# Patient Record
Sex: MALE | Race: OTHER | HISPANIC OR LATINO | Employment: UNEMPLOYED | URBAN - METROPOLITAN AREA
[De-identification: names, ages, dates, MRNs, and addresses within clinical notes are randomized per-mention and may not be internally consistent; named-entity substitution may affect disease eponyms.]

---

## 2023-01-31 ENCOUNTER — APPOINTMENT (EMERGENCY)
Dept: CT IMAGING | Facility: HOSPITAL | Age: 28
End: 2023-01-31

## 2023-01-31 ENCOUNTER — HOSPITAL ENCOUNTER (OUTPATIENT)
Facility: HOSPITAL | Age: 28
Setting detail: OBSERVATION
Discharge: HOME | End: 2023-01-31
Attending: STUDENT IN AN ORGANIZED HEALTH CARE EDUCATION/TRAINING PROGRAM | Admitting: SURGERY

## 2023-01-31 ENCOUNTER — ANESTHESIA (OUTPATIENT)
Dept: PERIOP | Facility: HOSPITAL | Age: 28
End: 2023-01-31

## 2023-01-31 ENCOUNTER — ANESTHESIA EVENT (OUTPATIENT)
Dept: PERIOP | Facility: HOSPITAL | Age: 28
End: 2023-01-31

## 2023-01-31 VITALS
HEIGHT: 68 IN | DIASTOLIC BLOOD PRESSURE: 80 MMHG | WEIGHT: 193.12 LBS | RESPIRATION RATE: 18 BRPM | HEART RATE: 92 BPM | SYSTOLIC BLOOD PRESSURE: 107 MMHG | BODY MASS INDEX: 29.27 KG/M2 | TEMPERATURE: 97.4 F | OXYGEN SATURATION: 97 %

## 2023-01-31 DIAGNOSIS — K35.80 ACUTE APPENDICITIS: ICD-10-CM

## 2023-01-31 DIAGNOSIS — K35.80 ACUTE APPENDICITIS, UNSPECIFIED ACUTE APPENDICITIS TYPE: Primary | ICD-10-CM

## 2023-01-31 PROBLEM — F17.200 SMOKING: Status: ACTIVE | Noted: 2023-01-31

## 2023-01-31 LAB
ALBUMIN SERPL BCP-MCNC: 4 G/DL (ref 3.5–5)
ALP SERPL-CCNC: 52 U/L (ref 43–122)
ALT SERPL W P-5'-P-CCNC: 13 U/L
ANION GAP SERPL CALCULATED.3IONS-SCNC: 7 MMOL/L (ref 5–14)
AST SERPL W P-5'-P-CCNC: 18 U/L (ref 17–59)
BACTERIA UR QL AUTO: ABNORMAL /HPF
BASOPHILS # BLD AUTO: 0.01 THOUSANDS/ÂΜL (ref 0–0.1)
BASOPHILS NFR BLD AUTO: 0 % (ref 0–1)
BILIRUB SERPL-MCNC: 0.39 MG/DL (ref 0.2–1)
BILIRUB UR QL STRIP: NEGATIVE
BUN SERPL-MCNC: 10 MG/DL (ref 5–25)
CALCIUM SERPL-MCNC: 9.2 MG/DL (ref 8.4–10.2)
CAOX CRY URNS QL MICRO: ABNORMAL /HPF
CHLORIDE SERPL-SCNC: 99 MMOL/L (ref 96–108)
CLARITY UR: CLEAR
CO2 SERPL-SCNC: 30 MMOL/L (ref 21–32)
COLOR UR: ABNORMAL
CREAT SERPL-MCNC: 0.84 MG/DL (ref 0.7–1.5)
EOSINOPHIL # BLD AUTO: 0.02 THOUSAND/ÂΜL (ref 0–0.61)
EOSINOPHIL NFR BLD AUTO: 0 % (ref 0–6)
ERYTHROCYTE [DISTWIDTH] IN BLOOD BY AUTOMATED COUNT: 11.6 % (ref 11.6–15.1)
GFR SERPL CREATININE-BSD FRML MDRD: 119 ML/MIN/1.73SQ M
GLUCOSE SERPL-MCNC: 140 MG/DL (ref 70–99)
GLUCOSE UR STRIP-MCNC: NEGATIVE MG/DL
HCT VFR BLD AUTO: 40.6 % (ref 36.5–49.3)
HGB BLD-MCNC: 13.8 G/DL (ref 12–17)
HGB UR QL STRIP.AUTO: 25
IMM GRANULOCYTES # BLD AUTO: 0.03 THOUSAND/UL (ref 0–0.2)
IMM GRANULOCYTES NFR BLD AUTO: 0 % (ref 0–2)
KETONES UR STRIP-MCNC: ABNORMAL MG/DL
LEUKOCYTE ESTERASE UR QL STRIP: NEGATIVE
LIPASE SERPL-CCNC: 30 U/L (ref 23–300)
LYMPHOCYTES # BLD AUTO: 0.83 THOUSANDS/ÂΜL (ref 0.6–4.47)
LYMPHOCYTES NFR BLD AUTO: 8 % (ref 14–44)
MCH RBC QN AUTO: 29.6 PG (ref 26.8–34.3)
MCHC RBC AUTO-ENTMCNC: 34 G/DL (ref 31.4–37.4)
MCV RBC AUTO: 87 FL (ref 82–98)
MONOCYTES # BLD AUTO: 0.47 THOUSAND/ÂΜL (ref 0.17–1.22)
MONOCYTES NFR BLD AUTO: 4 % (ref 4–12)
MUCOUS THREADS UR QL AUTO: ABNORMAL
NEUTROPHILS # BLD AUTO: 9.5 THOUSANDS/ÂΜL (ref 1.85–7.62)
NEUTS SEG NFR BLD AUTO: 88 % (ref 43–75)
NITRITE UR QL STRIP: NEGATIVE
NON-SQ EPI CELLS URNS QL MICRO: ABNORMAL /HPF
NRBC BLD AUTO-RTO: 0 /100 WBCS
PH UR STRIP.AUTO: 6 [PH]
PLATELET # BLD AUTO: 146 THOUSANDS/UL (ref 149–390)
PMV BLD AUTO: 10.7 FL (ref 8.9–12.7)
POTASSIUM SERPL-SCNC: 3.6 MMOL/L (ref 3.5–5.3)
PROT SERPL-MCNC: 7.1 G/DL (ref 6.4–8.4)
PROT UR STRIP-MCNC: ABNORMAL MG/DL
RBC # BLD AUTO: 4.67 MILLION/UL (ref 3.88–5.62)
RBC #/AREA URNS AUTO: ABNORMAL /HPF
SODIUM SERPL-SCNC: 136 MMOL/L (ref 135–147)
SP GR UR STRIP.AUTO: 1.02 (ref 1–1.04)
UROBILINOGEN UA: 1 MG/DL
WBC # BLD AUTO: 10.86 THOUSAND/UL (ref 4.31–10.16)
WBC #/AREA URNS AUTO: ABNORMAL /HPF

## 2023-01-31 RX ORDER — CEFAZOLIN SODIUM 2 G/50ML
2000 SOLUTION INTRAVENOUS EVERY 8 HOURS
Status: DISCONTINUED | OUTPATIENT
Start: 2023-01-31 | End: 2023-01-31

## 2023-01-31 RX ORDER — FENTANYL CITRATE/PF 50 MCG/ML
25 SYRINGE (ML) INJECTION
Status: DISCONTINUED | OUTPATIENT
Start: 2023-01-31 | End: 2023-01-31 | Stop reason: HOSPADM

## 2023-01-31 RX ORDER — OXYCODONE HYDROCHLORIDE AND ACETAMINOPHEN 5; 325 MG/1; MG/1
2 TABLET ORAL EVERY 4 HOURS PRN
Status: DISCONTINUED | OUTPATIENT
Start: 2023-01-31 | End: 2023-01-31 | Stop reason: HOSPADM

## 2023-01-31 RX ORDER — KETOROLAC TROMETHAMINE 30 MG/ML
15 INJECTION, SOLUTION INTRAMUSCULAR; INTRAVENOUS ONCE
Status: COMPLETED | OUTPATIENT
Start: 2023-01-31 | End: 2023-01-31

## 2023-01-31 RX ORDER — SODIUM CHLORIDE 9 MG/ML
100 INJECTION, SOLUTION INTRAVENOUS CONTINUOUS
Status: DISCONTINUED | OUTPATIENT
Start: 2023-01-31 | End: 2023-01-31 | Stop reason: HOSPADM

## 2023-01-31 RX ORDER — OXYCODONE HYDROCHLORIDE AND ACETAMINOPHEN 5; 325 MG/1; MG/1
1 TABLET ORAL EVERY 6 HOURS PRN
Qty: 20 TABLET | Refills: 0 | Status: CANCELLED | OUTPATIENT
Start: 2023-01-31 | End: 2023-02-10

## 2023-01-31 RX ORDER — HYDROMORPHONE HCL/PF 1 MG/ML
0.5 SYRINGE (ML) INJECTION
Status: DISCONTINUED | OUTPATIENT
Start: 2023-01-31 | End: 2023-01-31

## 2023-01-31 RX ORDER — MIDAZOLAM HYDROCHLORIDE 2 MG/2ML
INJECTION, SOLUTION INTRAMUSCULAR; INTRAVENOUS AS NEEDED
Status: DISCONTINUED | OUTPATIENT
Start: 2023-01-31 | End: 2023-01-31

## 2023-01-31 RX ORDER — GLYCOPYRROLATE 0.2 MG/ML
INJECTION INTRAMUSCULAR; INTRAVENOUS AS NEEDED
Status: DISCONTINUED | OUTPATIENT
Start: 2023-01-31 | End: 2023-01-31

## 2023-01-31 RX ORDER — ONDANSETRON 2 MG/ML
INJECTION INTRAMUSCULAR; INTRAVENOUS AS NEEDED
Status: DISCONTINUED | OUTPATIENT
Start: 2023-01-31 | End: 2023-01-31

## 2023-01-31 RX ORDER — CEFTRIAXONE 1 G/50ML
1000 INJECTION, SOLUTION INTRAVENOUS ONCE
Status: DISCONTINUED | OUTPATIENT
Start: 2023-01-31 | End: 2023-01-31

## 2023-01-31 RX ORDER — MORPHINE SULFATE 4 MG/ML
4 INJECTION, SOLUTION INTRAMUSCULAR; INTRAVENOUS ONCE
Status: COMPLETED | OUTPATIENT
Start: 2023-01-31 | End: 2023-01-31

## 2023-01-31 RX ORDER — ACETAMINOPHEN 325 MG/1
650 TABLET ORAL EVERY 6 HOURS PRN
Status: DISCONTINUED | OUTPATIENT
Start: 2023-01-31 | End: 2023-01-31 | Stop reason: HOSPADM

## 2023-01-31 RX ORDER — NEOSTIGMINE METHYLSULFATE 1 MG/ML
INJECTION INTRAVENOUS AS NEEDED
Status: DISCONTINUED | OUTPATIENT
Start: 2023-01-31 | End: 2023-01-31

## 2023-01-31 RX ORDER — CEFAZOLIN SODIUM 1 G/50ML
SOLUTION INTRAVENOUS AS NEEDED
Status: DISCONTINUED | OUTPATIENT
Start: 2023-01-31 | End: 2023-01-31

## 2023-01-31 RX ORDER — LIDOCAINE HYDROCHLORIDE 10 MG/ML
INJECTION, SOLUTION EPIDURAL; INFILTRATION; INTRACAUDAL; PERINEURAL AS NEEDED
Status: DISCONTINUED | OUTPATIENT
Start: 2023-01-31 | End: 2023-01-31

## 2023-01-31 RX ORDER — OXYCODONE HYDROCHLORIDE AND ACETAMINOPHEN 5; 325 MG/1; MG/1
1 TABLET ORAL EVERY 6 HOURS PRN
Qty: 20 TABLET | Refills: 0 | Status: SHIPPED | OUTPATIENT
Start: 2023-01-31 | End: 2023-02-10

## 2023-01-31 RX ORDER — FENTANYL CITRATE 50 UG/ML
INJECTION, SOLUTION INTRAMUSCULAR; INTRAVENOUS AS NEEDED
Status: DISCONTINUED | OUTPATIENT
Start: 2023-01-31 | End: 2023-01-31

## 2023-01-31 RX ORDER — SODIUM CHLORIDE 9 MG/ML
INJECTION, SOLUTION INTRAVENOUS AS NEEDED
Status: DISCONTINUED | OUTPATIENT
Start: 2023-01-31 | End: 2023-01-31 | Stop reason: HOSPADM

## 2023-01-31 RX ORDER — ONDANSETRON 2 MG/ML
4 INJECTION INTRAMUSCULAR; INTRAVENOUS EVERY 4 HOURS PRN
Status: DISCONTINUED | OUTPATIENT
Start: 2023-01-31 | End: 2023-01-31 | Stop reason: HOSPADM

## 2023-01-31 RX ORDER — HYDROMORPHONE HCL/PF 1 MG/ML
0.2 SYRINGE (ML) INJECTION
Status: DISCONTINUED | OUTPATIENT
Start: 2023-01-31 | End: 2023-01-31

## 2023-01-31 RX ORDER — DEXAMETHASONE SODIUM PHOSPHATE 10 MG/ML
INJECTION, SOLUTION INTRAMUSCULAR; INTRAVENOUS AS NEEDED
Status: DISCONTINUED | OUTPATIENT
Start: 2023-01-31 | End: 2023-01-31

## 2023-01-31 RX ORDER — ONDANSETRON 2 MG/ML
4 INJECTION INTRAMUSCULAR; INTRAVENOUS ONCE AS NEEDED
Status: DISCONTINUED | OUTPATIENT
Start: 2023-01-31 | End: 2023-01-31 | Stop reason: HOSPADM

## 2023-01-31 RX ORDER — OXYCODONE HYDROCHLORIDE AND ACETAMINOPHEN 5; 325 MG/1; MG/1
1 TABLET ORAL EVERY 4 HOURS PRN
Status: DISCONTINUED | OUTPATIENT
Start: 2023-01-31 | End: 2023-01-31 | Stop reason: HOSPADM

## 2023-01-31 RX ORDER — PROPOFOL 10 MG/ML
INJECTION, EMULSION INTRAVENOUS AS NEEDED
Status: DISCONTINUED | OUTPATIENT
Start: 2023-01-31 | End: 2023-01-31

## 2023-01-31 RX ORDER — ROCURONIUM BROMIDE 10 MG/ML
INJECTION, SOLUTION INTRAVENOUS AS NEEDED
Status: DISCONTINUED | OUTPATIENT
Start: 2023-01-31 | End: 2023-01-31

## 2023-01-31 RX ORDER — HYDROMORPHONE HCL/PF 1 MG/ML
SYRINGE (ML) INJECTION AS NEEDED
Status: DISCONTINUED | OUTPATIENT
Start: 2023-01-31 | End: 2023-01-31

## 2023-01-31 RX ORDER — KETOROLAC TROMETHAMINE 30 MG/ML
INJECTION, SOLUTION INTRAMUSCULAR; INTRAVENOUS AS NEEDED
Status: DISCONTINUED | OUTPATIENT
Start: 2023-01-31 | End: 2023-01-31

## 2023-01-31 RX ORDER — BUPIVACAINE HYDROCHLORIDE 5 MG/ML
INJECTION, SOLUTION PERINEURAL AS NEEDED
Status: DISCONTINUED | OUTPATIENT
Start: 2023-01-31 | End: 2023-01-31 | Stop reason: HOSPADM

## 2023-01-31 RX ADMIN — CEFAZOLIN SODIUM 2000 MG: 2 SOLUTION INTRAVENOUS at 12:56

## 2023-01-31 RX ADMIN — DEXAMETHASONE SODIUM PHOSPHATE 10 MG: 10 INJECTION INTRAMUSCULAR; INTRAVENOUS at 13:24

## 2023-01-31 RX ADMIN — KETOROLAC TROMETHAMINE 15 MG: 30 INJECTION, SOLUTION INTRAMUSCULAR; INTRAVENOUS at 00:51

## 2023-01-31 RX ADMIN — IOHEXOL 100 ML: 350 INJECTION, SOLUTION INTRAVENOUS at 01:35

## 2023-01-31 RX ADMIN — PIPERACILLIN AND TAZOBACTAM 3.38 G: 3; .375 INJECTION, POWDER, LYOPHILIZED, FOR SOLUTION INTRAVENOUS; PARENTERAL at 10:57

## 2023-01-31 RX ADMIN — FENTANYL CITRATE 50 MCG: 50 INJECTION, SOLUTION INTRAMUSCULAR; INTRAVENOUS at 13:28

## 2023-01-31 RX ADMIN — ACETAMINOPHEN 650 MG: 325 TABLET ORAL at 10:46

## 2023-01-31 RX ADMIN — MORPHINE SULFATE 4 MG: 4 INJECTION, SOLUTION INTRAMUSCULAR; INTRAVENOUS at 07:53

## 2023-01-31 RX ADMIN — FENTANYL CITRATE 50 MCG: 50 INJECTION, SOLUTION INTRAMUSCULAR; INTRAVENOUS at 13:00

## 2023-01-31 RX ADMIN — ONDANSETRON 4 MG: 2 INJECTION INTRAMUSCULAR; INTRAVENOUS at 14:00

## 2023-01-31 RX ADMIN — SODIUM CHLORIDE 1000 ML: 0.9 INJECTION, SOLUTION INTRAVENOUS at 00:51

## 2023-01-31 RX ADMIN — PROPOFOL 200 MG: 10 INJECTION, EMULSION INTRAVENOUS at 13:00

## 2023-01-31 RX ADMIN — LIDOCAINE HYDROCHLORIDE 50 MG: 10 INJECTION, SOLUTION EPIDURAL; INFILTRATION; INTRACAUDAL; PERINEURAL at 13:00

## 2023-01-31 RX ADMIN — KETOROLAC TROMETHAMINE 30 MG: 30 INJECTION, SOLUTION INTRAMUSCULAR; INTRAVENOUS at 14:00

## 2023-01-31 RX ADMIN — GLYCOPYRROLATE 0.4 MG: 0.2 INJECTION, SOLUTION INTRAMUSCULAR; INTRAVENOUS at 14:09

## 2023-01-31 RX ADMIN — SODIUM CHLORIDE 100 ML/HR: 0.9 INJECTION, SOLUTION INTRAVENOUS at 10:46

## 2023-01-31 RX ADMIN — MIDAZOLAM 2 MG: 1 INJECTION INTRAMUSCULAR; INTRAVENOUS at 12:56

## 2023-01-31 RX ADMIN — ROCURONIUM BROMIDE 30 MG: 10 INJECTION, SOLUTION INTRAVENOUS at 13:00

## 2023-01-31 RX ADMIN — NEOSTIGMINE 3 MG: 1 INJECTION INTRAVENOUS at 14:09

## 2023-01-31 RX ADMIN — ROCURONIUM BROMIDE 20 MG: 10 INJECTION, SOLUTION INTRAVENOUS at 13:28

## 2023-01-31 RX ADMIN — HYDROMORPHONE HYDROCHLORIDE 1 MG: 1 INJECTION, SOLUTION INTRAMUSCULAR; INTRAVENOUS; SUBCUTANEOUS at 14:11

## 2023-01-31 NOTE — QUICK NOTE
Hold abx until after repeat CT completed, Dr Nika Ma taking over call this morning and notified of patient

## 2023-01-31 NOTE — ED NOTES
Patient has completed his omniopaque  Is aware of the instruction from surgical service of waiting for 3 hours from the start of the drinking which was ar 0430 until cat scan should be completed  Lights dimmed  Is resting quietly and offers no complaints of pain at this time       Zabrina Smith RN  01/31/23 2059

## 2023-01-31 NOTE — DISCHARGE INSTR - AVS FIRST PAGE
[address] B  [facility] Surgery Discharge Instructions      1  General: You will feel pulling sensations around the wound or funny aches and pains around the incisions  You may have some bruising or mild redness  This is normal  Even minor surgery is a change in your body and this is your body’s reaction to it  If you have had abdominal surgery, it may help to support the incision with a small pillow or blanket for comfort when moving or coughing  There may be some hardness surrounding your incision which is your body's normal reaction to surgery  This typically softens up over time  A heating pad or ice pack can also be beneficial in the post-op period  2  Wound care: Make sure to remove the overlying dressing/bandage in about 24 hours, unless instructed otherwise  You usually don't have to redress the wound after 24-48 hours, unless for comfort  Keep the incision clean and dry  Let air get to it  If this Steri-Strips fall off, just keep the wound clean  If there is surgical glue in place this will usually start to soften in around 2 weeks and will eventually dissolve or fall off with gentle scrubbing in the shower  3  Water: You may shower over the wound, unless there are drain tubes left in place  Do not bathe or use a pool or hot tub until cleared by the physician  Do not swim in a lake or ocean until cleared by your physician  You may shower right over the staples or Steri-Strips and packing dry when you are done  4  Activity: You may go up and down stairs, walk as much as you are comfortable, but walk at least 3 times each day  If you have had abdominal surgery, do not lift anything heavier than 15 pounds for at least 2-4 weeks, unless cleared by the doctor  Notes and paperwork for your job are typically filled out at your post-op appointment but if there is a time constraint please call the office for assistance if this is needed prior to your post-op check  5  Diet: You may resume a regular diet  If you had a same-day surgery or overnight stay surgery, you may wish to eat lightly for a few days: soups, crackers, and sandwiches  You may resume a regular diet when ready  6  Medications: Resume all of your previous medications, unless told otherwise by the doctor  Ibuprofen (Advil, Motrin, etc ) is usually ok unless specifically discouraged by your surgeon  400-600 mg of ibuprofen every 6 hours for post-surgical pain is an acceptable regimen with a maximum dosage of 2400 mg per day  Avoid ibuprofen if you are taking aspirin  If you have a bleeding disorder or have stomach issues, talk to your surgeon prior to use  Tylenol is ok, unless you are taking any narcotic pain medication containing Tylenol (such as Percocet, Darvocet, Vicodin, or anything containing acetaminophen)  Be cautious taking additional Tylenol if you're taking these medications as there is a maximum dosage of 4,000 mg of Tylenol per day  You do not need to take the narcotic pain medications unless you are having significant pain and discomfort  7  Driving: You will need someone to drive you home on the day of surgery  Do not drive or make any important decisions while on narcotic pain medication or 24 hours and after anesthesia or sedation for surgery  Generally, you may drive when you are off all narcotic pain medications  8  Upset Stomach: You may take Maalox, Tums, or similar items for an upset stomach  If your narcotic pain medication causes an upset stomach, do not take it on an empty stomach  Try taking it with at least some crackers or toast      9  Constipation: Patients often experience constipation after surgery due to anesthesia and pain medication  This is especially common after abdominal surgery  You may take over-the-counter medication for this, such as Metamucil, Senokot, Dulcolax, milk of magnesia, etc  You may take a suppository unless you have had anorectal surgery such as a procedure on your hemorrhoids   If you experience significant nausea or vomiting after abdominal surgery, call the office before trying any of these medications  10  Pain: You may be given a prescription for pain  This will be prescribed the day of surgery and sent to your pharmacy of choice  Take the pain medication as prescribed, only if you need it  Narcotic pain medications (such as anything containing hydrocodone or oxycodone) have many side effects such as nausea/vomiting, constipation, dizziness and respiratory depression  Do not drive when taking the pain medication  Do not take more than prescribed in the 4-6 hour time period  11  Sexual Activity: You may resume sexual activity when you feel ready and comfortable and your incision is sealed and healed without apparent infection risk  12  Urination: If you haven't urinated in 6 hours and are unable to urinate please call the office  If you are having pain and can not urinate you need to be evaluated by a physician and should go to the emergency room  Call the office: If you are experiencing any of the following, fevers above 101 5°, significant nausea or vomiting, if the wound develops drainage and/or has excessive redness around the wound, or if you have significant diarrhea or other worsening symptoms

## 2023-01-31 NOTE — ED NOTES
Appeared to be sleeping upon entering room  Reports that his pain is less       Humberto Shannon RN  01/31/23 7749

## 2023-01-31 NOTE — ANESTHESIA PREPROCEDURE EVALUATION
Procedure:  APPENDECTOMY LAPAROSCOPIC (Abdomen)    Relevant Problems   CARDIO (within normal limits)      ENDO (within normal limits)      /RENAL (within normal limits)      MUSCULOSKELETAL (within normal limits)      PULMONARY   (+) Smoking      Digestive   (+) Acute appendicitis        Physical Exam    Airway    Mallampati score: II  TM Distance: >3 FB  Neck ROM: full     Dental       Cardiovascular  Cardiovascular exam normal    Pulmonary  Pulmonary exam normal     Other Findings        Anesthesia Plan  ASA Score- 2     Anesthesia Type- general with ASA Monitors  Additional Monitors:   Airway Plan: ETT  Plan Factors-Exercise tolerance (METS): >4 METS  Chart reviewed  Existing labs reviewed  Patient summary reviewed  Patient is a current smoker  Patient instructed to abstain from smoking on day of procedure  Patient did not smoke on day of surgery  Induction- intravenous  Postoperative Plan- Plan for postoperative opioid use  Informed Consent- Anesthetic plan and risks discussed with patient  I personally reviewed this patient with the CRNA  Discussed and agreed on the Anesthesia Plan with the CRNA  Lisa Pichardo             No results found for: HGBA1C    Lab Results   Component Value Date    K 3 6 01/31/2023    CL 99 01/31/2023    CO2 30 01/31/2023    BUN 10 01/31/2023    CREATININE 0 84 01/31/2023    CALCIUM 9 2 01/31/2023    AST 18 01/31/2023    ALT 13 01/31/2023    ALKPHOS 52 01/31/2023    EGFR 119 01/31/2023       Lab Results   Component Value Date    WBC 10 86 (H) 01/31/2023    HGB 13 8 01/31/2023    HCT 40 6 01/31/2023    MCV 87 01/31/2023     (L) 01/31/2023

## 2023-01-31 NOTE — H&P
H&P Exam - General Surgery   Tawana Kinsey 33 y o  male MRN: [id number]  Unit/Bed#: OR [place] Encounter: [id number]    Assessment/Plan     Assessment:  Acute appendicitis  Plan:  Discussed laparoscopic appendectomy, risks of procedure were explained and informed consent obtained  NPO for procedure  IV abx  Pain control  Discussed expected post op course and recovery, possible d/c later today if meets discharge criteria    History of Present Illness     HPI:  Tawana Kinsey is a 33 y o  male who presents with one day of abdominal pain, vomiting and diarrhea  Pain localizes to RLQ  He has had no prior abdominal surgeries  In the ED he had a CT scan showing mildly dilated appendix with equivocal periappendiceal inflammation  Repeat CT with PO contrast was done which showed increased appendiceal dilation, no opacification of appendix with cecal contrast  He has a leukocytosis of 10 8  Review of Systems   Gastrointestinal: Positive for abdominal pain, diarrhea and vomiting  All other systems reviewed and are negative        Historical Information   Past Medical History:   Diagnosis Date   • Known health problems: none      Past Surgical History:   Procedure Laterality Date   • NO PAST SURGERIES       Social History   Social History     Substance and Sexual Activity   Alcohol Use Yes    Comment: social     Social History     Substance and Sexual Activity   Drug Use Not Currently     Social History     Tobacco Use   Smoking Status Never   Smokeless Tobacco Not on file     E-Cigarette/Vaping   • E-Cigarette Use Current Some Day User      E-Cigarette/Vaping Substances     Family History: non-contributory    Meds/Allergies   all medications and allergies reviewed  No Known Allergies    Objective   First Vitals:   Blood Pressure: 116/67 (01/31/23 0022)  Pulse: 78 (01/31/23 0022)  Temperature: 100 °F (37 8 °C) (01/31/23 0022)  Temp Source: Tympanic (01/31/23 0022)  Respirations: 18 (01/31/23 0022)  Height: 5' 8" (172 7 cm) (01/31/23 1036)  Weight - Scale: 88 kg (194 lb) (01/31/23 0022)  SpO2: 98 % (01/31/23 0022)    Current Vitals:   Blood Pressure: 112/73 (01/31/23 1036)  Pulse: 76 (01/31/23 1036)  Temperature: 98 °F (36 7 °C) (01/31/23 1036)  Temp Source: Temporal (01/31/23 1036)  Respirations: 16 (01/31/23 1036)  Height: 5' 8" (172 7 cm) (01/31/23 1036)  Weight - Scale: 87 6 kg (193 lb 2 oz) (01/31/23 1036)  SpO2: 97 % (01/31/23 1036)      Intake/Output Summary (Last 24 hours) at 1/31/2023 1244  Last data filed at 1/31/2023 0227  Gross per 24 hour   Intake 1000 ml   Output --   Net 1000 ml       Invasive Devices     Peripheral Intravenous Line  Duration           Peripheral IV 01/31/23 Right Antecubital <1 day                Physical Exam  Vitals reviewed  Constitutional:       General: He is not in acute distress  Appearance: Normal appearance  HENT:      Head: Normocephalic and atraumatic  Nose: Nose normal       Mouth/Throat:      Mouth: Mucous membranes are moist       Pharynx: Oropharynx is clear  Eyes:      Conjunctiva/sclera: Conjunctivae normal       Pupils: Pupils are equal, round, and reactive to light  Cardiovascular:      Rate and Rhythm: Normal rate and regular rhythm  Heart sounds: Normal heart sounds  Pulmonary:      Effort: Pulmonary effort is normal  No respiratory distress  Breath sounds: Normal breath sounds  Abdominal:      General: Abdomen is flat  There is no distension  Palpations: Abdomen is soft  Tenderness: There is abdominal tenderness in the right lower quadrant  There is no guarding or rebound  Musculoskeletal:         General: No swelling or tenderness  Normal range of motion  Cervical back: Normal range of motion and neck supple  Skin:     General: Skin is warm and dry  Neurological:      General: No focal deficit present  Mental Status: He is alert and oriented to person, place, and time           Lab Results:   I have personally reviewed pertinent lab results  , CBC:   Lab Results   Component Value Date    WBC 10 86 (H) 01/31/2023    HGB 13 8 01/31/2023    HCT 40 6 01/31/2023    MCV 87 01/31/2023     (L) 01/31/2023    MCH 29 6 01/31/2023    MCHC 34 0 01/31/2023    RDW 11 6 01/31/2023    MPV 10 7 01/31/2023    NRBC 0 01/31/2023   , CMP:   Lab Results   Component Value Date    SODIUM 136 01/31/2023    K 3 6 01/31/2023    CL 99 01/31/2023    CO2 30 01/31/2023    BUN 10 01/31/2023    CREATININE 0 84 01/31/2023    CALCIUM 9 2 01/31/2023    AST 18 01/31/2023    ALT 13 01/31/2023    ALKPHOS 52 01/31/2023    EGFR 119 01/31/2023     Imaging: I have personally reviewed pertinent reports  and I have personally reviewed pertinent films in PACS  EKG, Pathology, and Other Studies: I have personally reviewed pertinent reports

## 2023-01-31 NOTE — OP NOTE
OPERATIVE REPORT  PATIENT NAME: Renetta Lees    :  1995  MRN: [id number]  Pt Location:  OR ROOM 12    SURGERY DATE: 2023    Surgeon(s) and Role:     * Maeve Flanagan MD - Primary    Preop Diagnosis:  Acute appendicitis, unspecified acute appendicitis type [K35 80]    Post-Op Diagnosis Codes:     * Acute appendicitis, unspecified acute appendicitis type [K35 80]    Procedure(s):  APPENDECTOMY LAPAROSCOPIC    Specimen(s):  ID Type Source Tests Collected by Time Destination   1 : APPENDIX Tissue Appendix TISSUE EXAM Maeve Flanagan MD 2023 1351        Estimated Blood Loss:   Minimal    Drains:  * No LDAs found *    Anesthesia Type:   General    Operative Indications:  Acute appendicitis, unspecified acute appendicitis type [K35 80]      Operative Findings:  Acute appendicitis, subcentimeter umbilical hernia    Complications:   None    Procedure and Technique:  Patient was identified in the preoperative holding area and taken back to the operating room  The patient was positioned supine on the operating room table  General anesthesia was then induced and the patient was prepped and draped in the standard sterile surgical fashion  Preoperative time-out was held confirming the correct patient, correct procedure and that all necessary equipment and personnel were present within the operating room  Preoperative antibiotics were administered prior to incision  Incision was made just below the umbilicus with a scalpel and a Veress needle was inserted into the abdomen  The abdomen was insufflated to 15 mmHg  Veress needle was then removed and a 5 mm port placed through this incision  Laparoscope was inserted into the port  Intra-abdominal contents were inspected and there was no trauma from port or needle placement  A 5 mm port was then placed in the suprapubic region and a 12 mm port in the left lower quadrant under direct visualization in similar fashion     Atraumatic graspers were used to bluntly manipulate the bowel in the right lower quadrant, identifying the cecum and the terminal ileum  The ileal rudder was grasped and the appendix was identified  There was fibrinous exudate at the tip of the appendix  The appendix was grasped with an atraumatic grasper and manipulated such that the mesoappendix was accessible  The Harmonic scalpel was used to dissect through the mesoappendix down to the appendix base  The appendix base was free of any inflammation  An Endo-ELISE stapler with a white load was then used to transect the appendix at the base  The appendix then placed into an Endo-Catch bag and removed from the abdomen  The field was irrigated and suctioned  The staple line was examined and was intact and hemostatic  The 12 mm port was then removed and an 0 vicryl suture on a suture closure device was used to close the fascia  The remaining trocars were then removed and the abdomen was allowed to desufflate  The umbilical hernia was closed with 0 Ethibond suture in figure-of-eight fashion  Local anesthetic was injected along each of the port sites  4-0 Monocryl suture was then used to close the skin in subcuticular fashion  Surgical glue was then applied  The patient was then awoken from general anesthesia and taken to the postoperative Care Unit  in good condition  All counts were correct at the end of the case       I was present for the entire procedure and A qualified resident physician was not available    Patient Disposition:  PACU  and hemodynamically stable    This procedure was not performed to treat colon cancer through resection      SIGNATURE: Dipti Beal MD  DATE: January 31, 2023  TIME: 2:31 PM

## 2023-01-31 NOTE — PLAN OF CARE
Problem: PAIN - ADULT  Goal: Verbalizes/displays adequate comfort level or baseline comfort level  Description: Interventions:  - Encourage patient to monitor pain and request assistance  - Assess pain using appropriate pain scale  - Administer analgesics based on type and severity of pain and evaluate response  - Implement non-pharmacological measures as appropriate and evaluate response  - Consider cultural and social influences on pain and pain management  - Notify physician/advanced practitioner if interventions unsuccessful or patient reports new pain  Outcome: Progressing     Problem: INFECTION - ADULT  Goal: Absence or prevention of progression during hospitalization  Description: INTERVENTIONS:  - Assess and monitor for signs and symptoms of infection  - Monitor lab/diagnostic results  - Monitor all insertion sites, i e  indwelling lines, tubes, and drains  - Monitor endotracheal if appropriate and nasal secretions for changes in amount and color  - [place] appropriate cooling/warming therapies per order  - Administer medications as ordered  - Instruct and encourage patient and family to use good hand hygiene technique  - Identify and instruct in appropriate isolation precautions for identified infection/condition  Outcome: Progressing

## 2023-01-31 NOTE — LETTER
[facility] OPERATING ROOM  [address] [facility] [address]  [phone]  Dept: [phone]    January 31, 2023     Patient: Olga Baca   YOB: 1995   Date of Visit: 1/31/2023       To Whom it May Concern:    Olga Baca is under my professional care  He was seen in the hospital from 1/31/2023 to 01/31/23  He should remain out of work until seen at his post-op appointment approximately 2 weeks after the surgery       If you have any questions or concerns, please don't hesitate to call           Sincerely,          Sharon Gomez MD

## 2023-01-31 NOTE — ED PROVIDER NOTES
History  Chief Complaint   Patient presents with   • Abdominal Pain     Vomiting x1 and and frequent diarrhea since yesterday  Right sided abd pain for 4 hours  Ibuprofen last dose 4 hours ago     The patient is a 52-year-old male with no significant past medical history, who presents for evaluation of abdominal pain  He reports developing diarrhea and vomiting yesterday  He has only had two episodes of nonbloody nonbilious vomiting--once yesterday and once today, however he reports persistent diarrhea  Approximately 4 hours ago he started experiencing abdominal pain  The pain is localized to the right lower quadrant and does not radiate into the back or groin  It is currently a 10 out of 10  He took ibuprofen several hours ago without relief  Denies dysuria, hematuria, frequency, urgency, constipation, blood in stool, chest pain, shortness of breath, flank pain, or fevers  No known sick contacts  None       Past Medical History:   Diagnosis Date   • Known health problems: none        Past Surgical History:   Procedure Laterality Date   • NO PAST SURGERIES         History reviewed  No pertinent family history  I have reviewed and agree with the history as documented  E-Cigarette/Vaping   • E-Cigarette Use Current Some Day User      E-Cigarette/Vaping Substances     Social History     Tobacco Use   • Smoking status: Never   Vaping Use   • Vaping Use: Some days   Substance Use Topics   • Alcohol use: Yes     Comment: social   • Drug use: Not Currently       Review of Systems   Constitutional: Negative for chills and fever  HENT: Negative for ear pain and sore throat  Eyes: Negative for pain and visual disturbance  Respiratory: Negative for cough and shortness of breath  Cardiovascular: Negative for chest pain and palpitations  Gastrointestinal: Positive for abdominal pain, diarrhea, nausea and vomiting  Negative for blood in stool and constipation     Genitourinary: Negative for dysuria, flank pain, frequency, hematuria and urgency  Musculoskeletal: Negative for arthralgias and back pain  Skin: Negative for color change and rash  Neurological: Negative for seizures and syncope  All other systems reviewed and are negative  Physical Exam  Physical Exam  Vitals and nursing note reviewed  Constitutional:       General: He is awake  Appearance: He is well-developed  He is not diaphoretic  Comments: Patient lying on the stretcher and appears very uncomfortable  HENT:      Head: Normocephalic and atraumatic  Right Ear: External ear normal       Left Ear: External ear normal       Nose: Nose normal       Mouth/Throat:      Lips: Pink  No lesions  Mouth: Mucous membranes are moist    Eyes:      General: Lids are normal  Gaze aligned appropriately  No scleral icterus  Conjunctiva/sclera: Conjunctivae normal       Pupils: Pupils are equal, round, and reactive to light  Cardiovascular:      Rate and Rhythm: Normal rate and regular rhythm  Heart sounds: S1 normal and S2 normal  No murmur heard  No friction rub  No gallop  Pulmonary:      Effort: Pulmonary effort is normal  No respiratory distress  Breath sounds: Normal breath sounds and air entry  No wheezing, rhonchi or rales  Abdominal:      General: Abdomen is flat  There is no distension  Palpations: Abdomen is soft  There is no mass  Tenderness: There is abdominal tenderness in the right lower quadrant  There is guarding  There is no right CVA tenderness or left CVA tenderness  Positive signs include McBurney's sign  Negative signs include Matthews's sign and Rovsing's sign  Musculoskeletal:      Cervical back: Normal, full passive range of motion without pain and neck supple  No tenderness or bony tenderness  Thoracic back: Normal  No tenderness or bony tenderness  Lumbar back: Normal  No tenderness or bony tenderness  Skin:     General: Skin is warm and dry        Capillary Refill: Capillary refill takes less than 2 seconds  Coloration: Skin is not cyanotic, jaundiced or pale  Findings: No erythema, lesion, petechiae or rash  Neurological:      Mental Status: He is alert and oriented to person, place, and time  Psychiatric:         Attention and Perception: Attention normal          Speech: Speech normal          Behavior: Behavior is cooperative           Vital Signs  ED Triage Vitals [01/31/23 0022]   Temperature Pulse Respirations Blood Pressure SpO2   100 °F (37 8 °C) 78 18 116/67 98 %      Temp Source Heart Rate Source Patient Position - Orthostatic VS BP Location FiO2 (%)   Tympanic Monitor Sitting Left arm --      Pain Score       10 - Worst Possible Pain           Vitals:    01/31/23 0200 01/31/23 0300 01/31/23 0400 01/31/23 0600   BP: 115/68 111/68 104/76 114/70   Pulse: 90 88 91 80   Patient Position - Orthostatic VS: Lying Lying Lying Lying       ED Medications  Medications   cefTRIAXone (ROCEPHIN) IVPB (premix in dextrose) 1,000 mg 50 mL (has no administration in time range)   sodium chloride 0 9 % bolus 1,000 mL (0 mL Intravenous Stopped 1/31/23 0227)   ketorolac (TORADOL) injection 15 mg (15 mg Intravenous Given 1/31/23 0051)   iohexol (OMNIPAQUE) 350 MG/ML injection (SINGLE-DOSE) 100 mL (100 mL Intravenous Given 1/31/23 0135)   iohexol (OMNIPAQUE) 240 MG/ML solution 50 mL (50 mL Oral Given 1/31/23 0430)       Diagnostic Studies  Results Reviewed     Procedure Component Value Units Date/Time    Urine Microscopic [[id number]]  (Abnormal) Collected: 01/31/23 0052    Lab Status: Final result Specimen: Urine, Clean Catch Updated: 01/31/23 0124     RBC, UA 1-2 /hpf      WBC, UA None Seen /hpf      Epithelial Cells Occasional /hpf      Bacteria, UA Occasional /hpf      Ca Oxalate Priscilla, UA Occasional /hpf      MUCUS THREADS Occasional    Lipase [[id number]]  (Normal) Collected: 01/31/23 0051    Lab Status: Final result Specimen: Blood from Arm, Right Updated: 01/31/23 1880 Lipase 30 u/L     Comprehensive metabolic panel [[id number]]  (Abnormal) Collected: 01/31/23 0051    Lab Status: Final result Specimen: Blood from Arm, Right Updated: 01/31/23 0118     Sodium 136 mmol/L      Potassium 3 6 mmol/L      Chloride 99 mmol/L      CO2 30 mmol/L      ANION GAP 7 mmol/L      BUN 10 mg/dL      Creatinine 0 84 mg/dL      Glucose 140 mg/dL      Calcium 9 2 mg/dL      AST 18 U/L      ALT 13 U/L      Alkaline Phosphatase 52 U/L      Total Protein 7 1 g/dL      Albumin 4 0 g/dL      Total Bilirubin 0 39 mg/dL      eGFR 119 ml/min/1 73sq m     Narrative:      National Kidney Disease Foundation guidelines for Chronic Kidney Disease (CKD):   •  Stage 1 with normal or high GFR (GFR > 90 mL/min/1 73 square meters)  •  Stage 2 Mild CKD (GFR = 60-89 mL/min/1 73 square meters)  •  Stage 3A Moderate CKD (GFR = 45-59 mL/min/1 73 square meters)  •  Stage 3B Moderate CKD (GFR = 30-44 mL/min/1 73 square meters)  •  Stage 4 Severe CKD (GFR = 15-29 mL/min/1 73 square meters)  •  Stage 5 End Stage CKD (GFR <15 mL/min/1 73 square meters)  Note: GFR calculation is accurate only with a steady state creatinine    UA (URINE) with reflex to Scope [[id number]]  (Abnormal) Collected: 01/31/23 0052    Lab Status: Final result Specimen: Urine, Clean Catch Updated: 01/31/23 0104     Color, UA Brie     Clarity, UA Clear     Specific Gravity, UA 1 025     pH, UA 6 0     Leukocytes, UA Negative     Nitrite, UA Negative     Protein, UA 15 (Trace) mg/dl      Glucose, UA Negative mg/dl      Ketones, UA 15 (1+) mg/dl      Bilirubin, UA Negative     Occult Blood, UA 25 0     UROBILINOGEN UA 1 0 mg/dL     CBC and differential [[id number]]  (Abnormal) Collected: 01/31/23 0051    Lab Status: Final result Specimen: Blood from Arm, Right Updated: 01/31/23 0101     WBC 10 86 Thousand/uL      RBC 4 67 Million/uL      Hemoglobin 13 8 g/dL      Hematocrit 40 6 %      MCV 87 fL      MCH 29 6 pg      MCHC 34 0 g/dL      RDW 11 6 %      MPV 10 7 fL      Platelets 131 Thousands/uL      nRBC 0 /100 WBCs      Neutrophils Relative 88 %      Immat GRANS % 0 %      Lymphocytes Relative 8 %      Monocytes Relative 4 %      Eosinophils Relative 0 %      Basophils Relative 0 %      Neutrophils Absolute 9 50 Thousands/µL      Immature Grans Absolute 0 03 Thousand/uL      Lymphocytes Absolute 0 83 Thousands/µL      Monocytes Absolute 0 47 Thousand/µL      Eosinophils Absolute 0 02 Thousand/µL      Basophils Absolute 0 01 Thousands/µL                  CT abdomen pelvis with contrast   Final Result by Reese Singer DO (01/31 2070)   Appendix averages 8 mm diameter, measuring up to 10 mm diameter at its midportion and demonstrates very subtle surrounding inflammatory changes which can be seen in the setting of early/mild appendicitis  No jolie perforation or abscess  Suspect tiny    appendicolith at the base  Surgical consultation advised  Mild hepatosplenomegaly  The study was marked in [facility] for immediate notification  I also personally texted the final impression to the ordering clinician Pb Niño via Tiger text on 1/31/2023 at 3:09 AM                 Workstation performed: [id number]         CT abdomen pelvis wo contrast    (Results Pending)              Procedures  Procedures         ED Course  ED Course as of 01/31/23 0718   Tue Jan 31, 2023   0132 Comprehensive metabolic panel(!)  Elevated random glucose  No electrolyte or LFT abnormalities  0133 RBC, UA: 1-2   0133 WBC, UA: None Seen   0133 Epithelial Cells: Occasional   0133 Bacteria, UA: Occasional   0133 WBC(!): 10 86   0133 Absolute Neutrophils(!): 9 50   0133 Platelet Count(!): 772   0134 Lipase: 30   0224 Updated patient and his wife on labs  He is lying comfortably in the bed and reports significant relief  He does not want any medications for pain or nausea at this time  Will reassess once CT results are back  4098 CT abdomen pelvis with contrast  IMPRESSION:  1  Appendix averages 8 mm diameter, measuring up to 10 mm diameter at its midportion and demonstrates very subtle surrounding inflammatory changes which can be seen in the setting of early/mild appendicitis  No jolie perforation or abscess  Suspect tiny   appendicolith at the base  Surgical consultation advised  2  Mild hepatosplenomegaly  0006 Reached out to the general surgeon on call regarding the CT scan results  Dr Eulogio Bond recommending repeating the CT scan with p o  contrast instead of IV contrast to further evaluate the appendix  After the patient finished drinking the contrast he was to wait 3 hours prior to being rescanned  1038 Discussed the general surgeon's recommendations with the patient and his wife  Once again patient is observed resting comfortably on the bed and denies the need for additional pain medication  4654 Patient finished p o  contrast  TigerTexted the CT tech  We will wait till 910 to rescan per general surgery's recommendation  8830 Patient signed out to Dr Anuel Prieto for reevaluation and final disposition after the second CT scan  Medical Decision Making  Patient presents with 2 days of diarrhea and vomiting with a new onset of abdominal pain  Temperature is 100 °F, no other abnormal vitals noted  On examination patient has point tenderness to palpation in the right lower quadrant with guarding and a positive McBurney's sign  Differential diagnosis includes but is not limited to appendicitis, colitis, enteritis, gastroenteritis, ileus, IBD, IBS, bowel obstruction, constipation, cholecystitis, biliary colic, choledocholithiasis, renal colic, pelvic pathology, or viral illness  CMP, lipase, and urine microscopic without any significant abnormalities  CBC showed a mildly elevated white blood cell count of 70985   CT scan revealed subtle inflammatory changes surrounding the appendix with a possible small appendicolith at the base of the appendix, concerning for early appendicitis  Reached out to the general surgeon on call regarding the CT scan results  Dr Shelly Hoskins recommended repeating the CT scan with p o  contrast instead of IV contrast to further evaluate the appendix  After the patient finished drinking the contrast he was to wait 3 hours prior to being rescanned  Discussed the recommendations with the patient and his wife, who are agreeable to waiting for the rescan  He continues to report significant pain relief from the toradol and has declined additional pain medications multiple times  He finished the p o  contrast 0610 and will be due for the second CT scan at 0910 per surgery's recommendation  Patient signed out to Dr Linda Greco for reevaluation and final disposition based off the results of the second CT scan  Amount and/or Complexity of Data Reviewed  Labs: ordered  Decision-making details documented in ED Course  Radiology: ordered  Risk  Prescription drug management  Disposition  Final diagnoses:   None     ED Disposition     None      Follow-up Information    None         Patient's Medications    No medications on file       No discharge procedures on file      PDMP Review     None          ED Provider  Electronically Signed by           Brittany Lantigua PA-C  01/31/23 8250

## 2023-01-31 NOTE — ANESTHESIA POSTPROCEDURE EVALUATION
Post-Op Assessment Note    CV Status:  Stable    Pain management: adequate     Mental Status:  Alert and awake   Hydration Status:  Euvolemic   PONV Controlled:  Controlled   Airway Patency:  Patent      Post Op Vitals Reviewed: Yes      Staff: CRNA         No notable events documented      /75 (01/31/23 1426)    Temp 97 9 °F (36 6 °C) (01/31/23 1426)    Pulse 61 (01/31/23 1426)   Resp 16 (01/31/23 1426)    SpO2 100 % (01/31/23 1426)

## 2023-01-31 NOTE — ED CARE HANDOFF
Emergency Department Sign Out Note        Sign out and transfer of care from Dr Dada Bird  See Separate Emergency Department note  The patient, Chris Zamudio, was evaluated by the previous provider for acute appendicitis  Workup Completed:  27-year-old male here with right lower quadrant abdominal pain nausea vomiting, initial CAT scan shows acute appendicitis but on-call surgeon would like a repeat CAT scan with p o  contrast     ED Course / Workup Pending (followup):  Repeat CT scan shows acute appendicitis as expected  Reached out to Dr Char Andrews for further recommendations hopefully admission for appendectomy  Patient admitted to Dr Char Andrews service                                   Procedures  MDM        Disposition  Final diagnoses:   Acute appendicitis     Time reflects when diagnosis was documented in both MDM as applicable and the Disposition within this note     Time User Action Codes Description Comment    1/31/2023  9:20 AM Gaby Raya Add [K35 80] Acute appendicitis, unspecified acute appendicitis type     1/31/2023  9:29 AM Henry Bryant Add [K35 80] Acute appendicitis       ED Disposition     ED Disposition   Admit    Condition   --    Date/Time   Tue Jan 31, 2023  9:29 AM    Comment   --         Follow-up Information    None       Patient's Medications    No medications on file     No discharge procedures on file         ED Provider  Electronically Signed by     Alli Maria MD  01/31/23 0930

## 2023-01-31 NOTE — NURSING NOTE
Went over discharge instructions with patient and wife  Patient is ambulating and feeling better  Pts wife will  medication  Lyft ride is being called for them

## 2023-01-31 NOTE — CASE MANAGEMENT
Case Management Assessment & Discharge Planning Note    Patient name Jamey Melendez  Location [facility] MRN [id number]  : 1995 Date 2023       Current Admission Date: 2023  Current Admission Diagnosis:Acute appendicitis   Patient Active Problem List    Diagnosis Date Noted   • Acute appendicitis 2023   • Smoking 2023      LOS (days): 0  Geometric Mean LOS (GMLOS) (days):   Days to GMLOS:     OBJECTIVE     Current admission status: Observation    Preferred Pharmacy:   [address] [place] [address] [id number] [address]  [address] [facility] [address]  Phone: [phone] Fax: [phone]    Primary Care Provider: No primary care provider on file  Primary Insurance:   Secondary Insurance:     ASSESSMENT:  Active Health Care Proxies    There are no active Health Care Proxies on file         Readmission Root Cause  30 Day Readmission: No    Patient Information  Admitted from[de-identified] Home  Mental Status: Alert  During Assessment patient was accompanied by: Not accompanied during assessment  Assessment information provided by[de-identified] Patient  Primary Caregiver: Self  Support Systems: Spouse/significant other, Self  [place] of Residence: [address] [place] [address] do you live in?: [facility]  Type of Current Residence: Other (Comment) (Private residence)  In the last 12 months, was there a time when you were not able to pay the mortgage or rent on time?: No  In the last 12 months, how many places have you lived?: 1  In the last 12 months, was there a time when you did not have a steady place to sleep or slept in a shelter (including now)?: No  Homeless/housing insecurity resource given?: Refused  Living Arrangements: Lives w/ Spouse/significant other  Is patient a ?: No    Activities of Daily Living Prior to Admission  Functional Status: Independent  Completes ADLs independently?: Yes  Ambulates independently?: Yes  Does patient use assisted devices?: No  Does patient currently own DME?: No  Does patient have a history of Outpatient Therapy (PT/OT)?: No  Does the patient have a history of Short-Term Rehab?: No  Does patient have a history of HHC?: No  Does patient currently have [facility]?: No         Patient Information Continued  Income Source: Employed  Does patient have prescription coverage?: Yes  Within the past 12 months, you worried that your food would run out before you got the money to buy more : Never true  Within the past 12 months, the food you bought just didn't last and you didn't have money to get more : Never true  Food insecurity resource given?: Refused  Does patient receive dialysis treatments?: No  Does patient have a history of substance abuse?: No  Does patient have a history of Mental Health Diagnosis?: No    PHQ 2/9 Screening   Reviewed PHQ 2/9 Depression Screening Score?: No    Means of Transportation  Means of Transport to Appts[de-identified] Family transport  In the past 12 months, has lack of transportation kept you from medical appointments or from getting medications?: No  In the past 12 months, has lack of transportation kept you from meetings, work, or from getting things needed for daily living?: No  Was application for public transport provided?: Refused        DISCHARGE DETAILS:    Discharge planning discussed with[de-identified] Pt and pt's Spouse  Freedom of Choice: Yes  Comments - Freedom of Choice: Pt will return back to his previous environment    CM contacted family/caregiver?: Yes  Were Treatment Team discharge recommendations reviewed with patient/caregiver?: Yes  Did patient/caregiver verbalize understanding of patient care needs?: Yes  Were patient/caregiver advised of the risks associated with not following Treatment Team discharge recommendations?: Yes    Contacts  Patient Contacts: Kam Barraza (Spouse)  Relationship to Patient[de-identified] Family  Contact Method: Phone  Phone Number: [phone] (M)  Reason/Outcome: Continuity of Care    Requested [address]         Is the patient interested in [address] at discharge?: No    DME Referral Provided  Referral made for DME?: No    Other Referral/Resources/Interventions Provided:  Interventions: PCP, Other (Specify) (Pt requested work note by physician)      Treatment Team Recommendation: Home  Discharge Destination Plan[de-identified] Home  Transport at Discharge :  Other (Comment) ([employer] lyft ride to be  arranged by Floor Nurse)    Accompanied by: Caregiver

## 2023-01-31 NOTE — ED NOTES
Patient seen by provider with results of testing done so far reviewed with patient  Patient resting quietly       Sabrina James RN  01/31/23 1622

## 2023-02-21 ENCOUNTER — OFFICE VISIT (OUTPATIENT)
Dept: SURGERY | Facility: CLINIC | Age: 28
End: 2023-02-21

## 2023-02-21 VITALS
TEMPERATURE: 97.4 F | WEIGHT: 193 LBS | BODY MASS INDEX: 29.25 KG/M2 | OXYGEN SATURATION: 98 % | HEART RATE: 88 BPM | HEIGHT: 68 IN

## 2023-02-21 DIAGNOSIS — Z98.890 POST-OPERATIVE STATE: Primary | ICD-10-CM

## 2023-02-21 NOTE — PROGRESS NOTES
Assessment/Plan:  Status post laparoscopic appendectomy, doing well  Pathology was reviewed  He can return to work  Follow-up as needed  No problem-specific Assessment & Plan notes found for this encounter  Diagnoses and all orders for this visit:    Post-operative state          Subjective:      Patient ID: Kashif Xavier is a 33 y o  male  He presents for postop status post laparoscopic appendectomy  He is doing well, denies any pain  No fevers chills, no p o  intolerance  The following portions of the patient's history were reviewed and updated as appropriate: allergies, current medications, past family history, past medical history, past social history, past surgical history and problem list     Review of Systems   All other systems reviewed and are negative  Objective:      Pulse 88   Temp (!) 97 4 °F (36 3 °C) (Tympanic)   Ht 5' 8" (1 727 m)   Wt 87 5 kg (193 lb)   SpO2 98%   BMI 29 35 kg/m²          Physical Exam  Vitals reviewed  Constitutional:       General: He is not in acute distress  Appearance: Normal appearance  He is normal weight  Abdominal:       Neurological:      Mental Status: He is alert

## 2024-06-18 ENCOUNTER — OFFICE VISIT (OUTPATIENT)
Dept: FAMILY MEDICINE CLINIC | Facility: CLINIC | Age: 29
End: 2024-06-18
Payer: COMMERCIAL

## 2024-06-18 VITALS
TEMPERATURE: 96.7 F | OXYGEN SATURATION: 97 % | HEIGHT: 68 IN | HEART RATE: 85 BPM | SYSTOLIC BLOOD PRESSURE: 110 MMHG | WEIGHT: 204.2 LBS | DIASTOLIC BLOOD PRESSURE: 80 MMHG | BODY MASS INDEX: 30.95 KG/M2

## 2024-06-18 DIAGNOSIS — R42 DIZZINESS: ICD-10-CM

## 2024-06-18 DIAGNOSIS — Z00.00 ANNUAL PHYSICAL EXAM: Primary | ICD-10-CM

## 2024-06-18 DIAGNOSIS — R22.9 SKIN MASS: ICD-10-CM

## 2024-06-18 DIAGNOSIS — F17.200 SMOKING: ICD-10-CM

## 2024-06-18 DIAGNOSIS — M25.442 FINGER JOINT SWELLING, LEFT: ICD-10-CM

## 2024-06-18 DIAGNOSIS — Z11.59 ENCOUNTER FOR HEPATITIS C SCREENING TEST FOR LOW RISK PATIENT: ICD-10-CM

## 2024-06-18 DIAGNOSIS — M21.612 BUNION OF LEFT FOOT: ICD-10-CM

## 2024-06-18 DIAGNOSIS — Z11.4 SCREENING FOR HIV (HUMAN IMMUNODEFICIENCY VIRUS): ICD-10-CM

## 2024-06-18 DIAGNOSIS — G44.229 CHRONIC TENSION-TYPE HEADACHE, NOT INTRACTABLE: ICD-10-CM

## 2024-06-18 PROBLEM — K35.80 ACUTE APPENDICITIS: Status: RESOLVED | Noted: 2023-01-31 | Resolved: 2024-06-18

## 2024-06-18 PROCEDURE — 99385 PREV VISIT NEW AGE 18-39: CPT | Performed by: NURSE PRACTITIONER

## 2024-06-18 PROCEDURE — 99204 OFFICE O/P NEW MOD 45 MIN: CPT | Performed by: NURSE PRACTITIONER

## 2024-06-18 RX ORDER — LANOLIN ALCOHOL/MO/W.PET/CERES
400 CREAM (GRAM) TOPICAL 2 TIMES DAILY
Qty: 60 TABLET | Refills: 11 | Status: SHIPPED | OUTPATIENT
Start: 2024-06-18

## 2024-06-18 NOTE — ASSESSMENT & PLAN NOTE
Will get xray   Refer to podiatry  Has already been doing medication. Change of shoes and inserts all without relief  Was told needed surgery before- many years ago

## 2024-06-18 NOTE — ASSESSMENT & PLAN NOTE
Recommend patient have vision exam as this can be reason for headaches and dizziness   Would like patient to start magnesium and vitamin b2 and start journaling headaches  Can continue to take tylenol since provides relief

## 2024-06-18 NOTE — ASSESSMENT & PLAN NOTE
Unspecified. Not associated with other symptoms. Can be related to headaches  Advised to monitor and make sure eating and drinking well  Has elevated sugar in hospital last year. New labs ordered to check secondary causes.  Also advised to have eye exam completed.

## 2024-06-18 NOTE — ASSESSMENT & PLAN NOTE
Patient has multiple skin masses.   Left back, right back lumbar   Left abdominal wall, bilateral upper abdominal wall  Behind right knee   Some are painful   Will refer to general surgery

## 2024-06-18 NOTE — ASSESSMENT & PLAN NOTE
Injury one year ago cut himself with knife. He was sonly sutrued up but since his left pinky finger gets swollen and at times doesn't work to flex the right way.   Will refer to hand surgery but given time since injury not sure what else they can provide.   Possible early trigger finger as well  Xray ordered since acute swelling

## 2024-06-19 ENCOUNTER — TELEPHONE (OUTPATIENT)
Age: 29
End: 2024-06-19

## 2024-06-19 NOTE — TELEPHONE ENCOUNTER
Patients spouse called the office asking for the [facility] central scheduling number. Number given no further questions.

## 2024-06-20 ENCOUNTER — HOSPITAL ENCOUNTER (OUTPATIENT)
Dept: RADIOLOGY | Facility: HOSPITAL | Age: 29
Discharge: HOME | End: 2024-06-20
Payer: COMMERCIAL

## 2024-06-20 ENCOUNTER — APPOINTMENT (OUTPATIENT)
Dept: LAB | Facility: CLINIC | Age: 29
End: 2024-06-20
Payer: COMMERCIAL

## 2024-06-20 DIAGNOSIS — M21.612 BUNION OF LEFT FOOT: ICD-10-CM

## 2024-06-20 DIAGNOSIS — M25.442 FINGER JOINT SWELLING, LEFT: ICD-10-CM

## 2024-06-20 DIAGNOSIS — Z00.00 ANNUAL PHYSICAL EXAM: ICD-10-CM

## 2024-06-20 DIAGNOSIS — Z11.59 ENCOUNTER FOR HEPATITIS C SCREENING TEST FOR LOW RISK PATIENT: ICD-10-CM

## 2024-06-20 DIAGNOSIS — Z11.4 SCREENING FOR HIV (HUMAN IMMUNODEFICIENCY VIRUS): ICD-10-CM

## 2024-06-20 LAB
ALBUMIN SERPL BCP-MCNC: 4.4 G/DL (ref 3.5–5)
ALP SERPL-CCNC: 53 U/L (ref 34–104)
ALT SERPL W P-5'-P-CCNC: 12 U/L (ref 7–52)
ANION GAP SERPL CALCULATED.3IONS-SCNC: 10 MMOL/L (ref 4–13)
AST SERPL W P-5'-P-CCNC: 15 U/L (ref 13–39)
BASOPHILS # BLD AUTO: 0.02 THOUSANDS/ÂΜL (ref 0–0.1)
BASOPHILS NFR BLD AUTO: 0 % (ref 0–1)
BILIRUB SERPL-MCNC: 0.35 MG/DL (ref 0.2–1)
BUN SERPL-MCNC: 12 MG/DL (ref 5–25)
CALCIUM SERPL-MCNC: 9.6 MG/DL (ref 8.4–10.2)
CHLORIDE SERPL-SCNC: 103 MMOL/L (ref 96–108)
CHOLEST SERPL-MCNC: 185 MG/DL
CO2 SERPL-SCNC: 28 MMOL/L (ref 21–32)
CREAT SERPL-MCNC: 0.81 MG/DL (ref 0.6–1.3)
EOSINOPHIL # BLD AUTO: 0.06 THOUSAND/ÂΜL (ref 0–0.61)
EOSINOPHIL NFR BLD AUTO: 1 % (ref 0–6)
ERYTHROCYTE [DISTWIDTH] IN BLOOD BY AUTOMATED COUNT: 11.9 % (ref 11.6–15.1)
GFR SERPL CREATININE-BSD FRML MDRD: 120 ML/MIN/1.73SQ M
GLUCOSE P FAST SERPL-MCNC: 97 MG/DL (ref 65–99)
HCT VFR BLD AUTO: 45.1 % (ref 36.5–49.3)
HCV AB SER QL: NORMAL
HDLC SERPL-MCNC: 37 MG/DL
HGB BLD-MCNC: 15.2 G/DL (ref 12–17)
HIV 1+2 AB+HIV1 P24 AG SERPL QL IA: NORMAL
HIV 2 AB SERPL QL IA: NORMAL
HIV1 AB SERPL QL IA: NORMAL
HIV1 P24 AG SERPL QL IA: NORMAL
IMM GRANULOCYTES # BLD AUTO: 0.02 THOUSAND/UL (ref 0–0.2)
IMM GRANULOCYTES NFR BLD AUTO: 0 % (ref 0–2)
LDLC SERPL CALC-MCNC: 92 MG/DL (ref 0–100)
LYMPHOCYTES # BLD AUTO: 2 THOUSANDS/ÂΜL (ref 0.6–4.47)
LYMPHOCYTES NFR BLD AUTO: 35 % (ref 14–44)
MCH RBC QN AUTO: 30 PG (ref 26.8–34.3)
MCHC RBC AUTO-ENTMCNC: 33.7 G/DL (ref 31.4–37.4)
MCV RBC AUTO: 89 FL (ref 82–98)
MONOCYTES # BLD AUTO: 0.28 THOUSAND/ÂΜL (ref 0.17–1.22)
MONOCYTES NFR BLD AUTO: 5 % (ref 4–12)
NEUTROPHILS # BLD AUTO: 3.29 THOUSANDS/ÂΜL (ref 1.85–7.62)
NEUTS SEG NFR BLD AUTO: 59 % (ref 43–75)
NRBC BLD AUTO-RTO: 0 /100 WBCS
PLATELET # BLD AUTO: 155 THOUSANDS/UL (ref 149–390)
PMV BLD AUTO: 12.4 FL (ref 8.9–12.7)
POTASSIUM SERPL-SCNC: 4.6 MMOL/L (ref 3.5–5.3)
PROT SERPL-MCNC: 6.9 G/DL (ref 6.4–8.4)
RBC # BLD AUTO: 5.06 MILLION/UL (ref 3.88–5.62)
SODIUM SERPL-SCNC: 141 MMOL/L (ref 135–147)
TRIGL SERPL-MCNC: 281 MG/DL
TSH SERPL DL<=0.05 MIU/L-ACNC: 1.74 UIU/ML (ref 0.45–4.5)
WBC # BLD AUTO: 5.67 THOUSAND/UL (ref 4.31–10.16)

## 2024-06-20 PROCEDURE — 36415 COLL VENOUS BLD VENIPUNCTURE: CPT

## 2024-06-20 PROCEDURE — 73630 X-RAY EXAM OF FOOT: CPT

## 2024-06-20 PROCEDURE — 80061 LIPID PANEL: CPT

## 2024-06-20 PROCEDURE — 87389 HIV-1 AG W/HIV-1&-2 AB AG IA: CPT

## 2024-06-20 PROCEDURE — 73140 X-RAY EXAM OF FINGER(S): CPT

## 2024-06-20 PROCEDURE — 80053 COMPREHEN METABOLIC PANEL: CPT | Performed by: NURSE PRACTITIONER

## 2024-06-20 PROCEDURE — 86803 HEPATITIS C AB TEST: CPT

## 2024-06-20 PROCEDURE — 85025 COMPLETE CBC W/AUTO DIFF WBC: CPT

## 2024-06-20 PROCEDURE — 84443 ASSAY THYROID STIM HORMONE: CPT

## 2024-06-21 ENCOUNTER — OFFICE VISIT (OUTPATIENT)
Dept: OBGYN CLINIC | Facility: CLINIC | Age: 29
End: 2024-06-21
Payer: COMMERCIAL

## 2024-06-21 VITALS
SYSTOLIC BLOOD PRESSURE: 110 MMHG | BODY MASS INDEX: 30.92 KG/M2 | HEIGHT: 68 IN | WEIGHT: 204 LBS | DIASTOLIC BLOOD PRESSURE: 80 MMHG

## 2024-06-21 DIAGNOSIS — S69.92XA INJURY OF LEFT LITTLE FINGER, INITIAL ENCOUNTER: Primary | ICD-10-CM

## 2024-06-21 DIAGNOSIS — M25.642 FINGER STIFFNESS, LEFT: ICD-10-CM

## 2024-06-21 PROCEDURE — 99203 OFFICE O/P NEW LOW 30 MIN: CPT | Performed by: PHYSICIAN ASSISTANT

## 2024-06-21 RX ORDER — MELOXICAM 15 MG/1
15 TABLET ORAL DAILY
Qty: 30 TABLET | Refills: 0 | Status: SHIPPED | OUTPATIENT
Start: 2024-06-21

## 2024-06-21 NOTE — PROGRESS NOTES
Patient Name:  Wing Stein  MRN:  [id number]    Assessment & Plan     Left small finger discomfort, stiffness, and altered sensation after laceration 1 year ago while living in the [place].  Patient exhibits limited flexion of the DIP joint which is suggestive of possible FDP injury.  He also notes altered sensation on the ulnar aspect of the small finger suggestive of possible digital nerve injury as well.    He notes generalized discomfort about the entire digit with stiffness with range of motion likely due to his injury as well.  At this time I recommend initial course of conservative management consisting of occupational therapy.  Referral placed today.  Prescription also provided for meloxicam.  Activities as tolerated modification avoid pain.  Follow-up in 6 weeks with one of our hand specialists.     utilized during today's encounter.    Chief Complaint     Left small finger pain and stiffness.    History of the Present Illness     Wing Stein is a 29 y.o. right-hand-dominant male who reports to the office today for evaluation of his left small finger.  Patient sustained a laceration to his left small finger approximately 1 year ago while cutting up plantains while living in the [place].  The laceration occurred on the volar aspect of the finger in the region of the PIP joint.  He states he did report to the emergency department there.  The laceration was closed with sutures and subsequently removed without complication.  There is no infection.  He did not perform any therapy after the injury.  He reports to the office today noting persistent discomfort in the digits generalized about the entire digit.  He notes limited motion at the DIP joint.  He notes some slight altered sensation along the ulnar aspect of the digit.  Currently takes nothing for pain.  He notes weakness due to the pain.  He denies any instability.  No fevers or  "chills.    Physical Exam     /80   Ht 5' 8\" (1.727 m)   Wt 92.5 kg (204 lb)   BMI 31.02 kg/m²     Left small finger: Healed scar on the volar aspect of the digit in the region of the PIP joint which does extend to the ulnar aspect of the digit.  No significant soft tissue swelling.  No erythema or open wounds.  No ecchymosis.  No palpable tenderness over the A1 pulley of the digit.  No significant tenderness over the MCP joint.  There is some tenderness generalized distally from the MCP joint.  MCP joint range of motion is intact and full without significant pain.  Slightly limited PIP joint range of motion with regards to flexion extension with slight discomfort.  Limited flexion at the DIP joint.  Extension at the DIP joint is intact.  Sensation is intact but diminished along the ulnar aspect of the digit distal to the laceration scar.  Sensation is intact along the radial aspect of the digit.  Near full composite fist formation.  Brisk capillary refill.    Eyes: Anicteric sclerae.  ENT: Trachea midline.  Lungs: Normal respiratory effort.  CV: Capillary refill is less than 2 seconds.  Skin: Intact without erythema.  Lymph: No palpable lymphadenopathy.  Neuro: Sensation is grossly intact to light touch.  Psych: Mood and affect are appropriate.    Data Review     I have personally reviewed pertinent films in PACS, and my interpretation follows:    X-rays left small finger 6/20/2024: No acute osseous abnormality.  No fracture or dislocation.  No significant degenerative changes.    Past Medical History:   Diagnosis Date    Known health problems: none        Past Surgical History:   Procedure Laterality Date    APPENDECTOMY LAPAROSCOPIC N/A 1/31/2023    Procedure: APPENDECTOMY LAPAROSCOPIC;  Surgeon: Rosemary Raya MD;  Location:  MAIN OR;  Service: General    NO PAST SURGERIES         No Known Allergies    Current Outpatient Medications on File Prior to Visit   Medication Sig Dispense Refill    " magnesium Oxide (MAG-OX) 400 mg TABS Take 1 tablet (400 mg total) by mouth 2 (two) times a day 60 tablet 11    Riboflavin 100 MG TABS Take 2 tablets (200 mg total) by mouth 2 (two) times a day 360 tablet 3     No current facility-administered medications on file prior to visit.       Social History     Tobacco Use    Smoking status: Never    Smokeless tobacco: Never   Vaping Use    Vaping status: Some Days   Substance Use Topics    Alcohol use: Yes     Comment: social    Drug use: Not Currently       No family history on file.    Review of Systems     As stated in the HPI. All other systems reviewed and are negative.

## 2024-06-24 ENCOUNTER — HOSPITAL ENCOUNTER (OUTPATIENT)
Dept: RADIOLOGY | Facility: HOSPITAL | Age: 29
Discharge: HOME | End: 2024-06-24
Payer: COMMERCIAL

## 2024-06-24 DIAGNOSIS — M21.42 FLAT FEET: ICD-10-CM

## 2024-06-24 DIAGNOSIS — M21.41 FLAT FEET: ICD-10-CM

## 2024-06-24 DIAGNOSIS — M20.11 ACQUIRED HALLUX VALGUS OF RIGHT FOOT: ICD-10-CM

## 2024-06-24 DIAGNOSIS — M20.12 ACQUIRED HALLUX VALGUS OF LEFT FOOT: ICD-10-CM

## 2024-06-24 PROCEDURE — 73630 X-RAY EXAM OF FOOT: CPT

## 2024-07-09 ENCOUNTER — OFFICE VISIT (OUTPATIENT)
Dept: SURGERY | Facility: CLINIC | Age: 29
End: 2024-07-09
Payer: COMMERCIAL

## 2024-07-09 VITALS
WEIGHT: 201 LBS | TEMPERATURE: 98.2 F | HEIGHT: 68 IN | DIASTOLIC BLOOD PRESSURE: 80 MMHG | BODY MASS INDEX: 30.46 KG/M2 | SYSTOLIC BLOOD PRESSURE: 112 MMHG | HEART RATE: 78 BPM | OXYGEN SATURATION: 97 %

## 2024-07-09 DIAGNOSIS — D17.9 MULTIPLE LIPOMAS: Primary | ICD-10-CM

## 2024-07-09 DIAGNOSIS — R22.9 SKIN MASS: ICD-10-CM

## 2024-07-09 PROCEDURE — 99244 OFF/OP CNSLTJ NEW/EST MOD 40: CPT | Performed by: SPECIALIST

## 2024-07-09 NOTE — LETTER
July 9, 2024     DANILO Stringer  [address]  [address]  [place] [address]    Patient: Wing Stein   YOB: 1995   Date of Visit: 7/9/2024       Dear Chitra,    Thank you for referring Wing Stein to me for evaluation. Below are the relevant portions of my H&P.    If you have questions, please do not hesitate to call me. I look forward to following Wing along with you.         Sincerely,    King Marty Fraser MD        CC: No Recipients    Marty Fraser MD  7/9/2024  2:47 PM  Signed  Chief Complaint: Multiple painful lipomas      History of Present Illness: Patient is a 29-year-old  male who speaks minimal English who presents the office today with his significant other Marlena.  He is here today f because he has multiple painful masses on his back and his abdomen.  He also has 1 on his right leg.  He was recently seen in the office of his PCP Chitra CARRASCO who examined him and felt visual lipomas.  She kindly referred him to our office for evaluation.    The patient states these masses are painful and with pressure they hurt more.      Past Medical History:   Past Medical History:   Diagnosis Date   • Known health problems: none          Past Surgical History:    Past Surgical History:   Procedure Laterality Date   • APPENDECTOMY LAPAROSCOPIC N/A 1/31/2023    Procedure: APPENDECTOMY LAPAROSCOPIC;  Surgeon: Rosemary Raya MD;  Location: [facility] OR;  Service: General   • NO PAST SURGERIES           Allergies:  No Known Allergies      Medications:    Current Outpatient Medications:   •  magnesium Oxide (MAG-OX) 400 mg TABS, Take 1 tablet (400 mg total) by mouth 2 (two) times a day, Disp: 60 tablet, Rfl: 11  •  meloxicam (Mobic) 15 mg tablet, Take 1 tablet (15 mg total) by mouth daily, Disp: 30 tablet, Rfl: 0  •  Riboflavin 100 MG TABS, Take 2 tablets (200 mg total) by mouth 2 (two) times a day, Disp: 360 tablet, Rfl: 3      Social History:   Social History    Social History     Substance and Sexual Activity   Alcohol Use Yes    Comment: social     Social History     Substance and Sexual Activity   Drug Use Not Currently     Social History     Tobacco Use   Smoking Status Never   Smokeless Tobacco Never         Family History:  History reviewed. No pertinent family history.      Review of Systems:    Negative for 12 systems reviewed.    Vitals:  Vitals:    07/09/24 1240   BP: 112/80   Pulse: 78   Temp: 98.2 °F (36.8 °C)   SpO2: 97%       Physical Exam:  The patient is a healthy-appearing young adult  male 5 foot 8 inches 201 pounds.  He is awake alert no distress.    Vital signs as above    Skin warm dry  Head normocephalic and atraumatic  Eyes PERRLA EOM intact  Ears and nose within normal limits  Throat gag reflex intact  Neck no masses thyromegaly lymphadenopathy palpable.  Back there are 3 small palpable lesions in the back that are tender consistent with lipomas.  Other than that he has no CVA or spinal tenderness.  Lungs clear to A&P  Cor regular in rhythm no murmurs or bruits  Abdomen flat firm there are 2 palpable lipomas noted on each flank freely movable and somewhat tender to palpation.  Is no other abdominal masses or hernias noted.  He has some's well-healed laparoscopic incisions consistent with a previous laparoscopic appendectomy.  Extremities on the right lower extremity laterally noted is a palpable lipoma that is tender to palpation.  Other than that he has no cyanosis clubbing or edema.  Neurologically ANO x 3 cranials 2-12 intact  Lymphatics no lymphadenopathy palpable.      Lab Results: I have personally reviewed pertinent reports. See below.  Imaging: I have personally reviewed pertinent reports.   EKG, Pathology, and Other Studies: I have personally reviewed pertinent reports.     No visits with results within 1 Day(s) from this visit.   Latest known visit with results is:   Appointment on 06/20/2024   Component Date Value    • HIV-1 p24 Antigen 06/20/2024 Non-Reactive    • HIV-1 Antibody 06/20/2024 Non-Reactive    • HIV-2 Antibody 06/20/2024 Non-Reactive    • HIV Ag-Ab 5th Gen 06/20/2024 Non-Reactive    • Hepatitis C Ab 06/20/2024 Non-reactive    • Cholesterol 06/20/2024 185    • Triglycerides 06/20/2024 281 (H)    • HDL, Direct 06/20/2024 37 (L)    • LDL Calculated 06/20/2024 92    • WBC 06/20/2024 5.67    • RBC 06/20/2024 5.06    • Hemoglobin 06/20/2024 15.2    • Hematocrit 06/20/2024 45.1    • MCV 06/20/2024 89    • MCH 06/20/2024 30.0    • MCHC 06/20/2024 33.7    • RDW 06/20/2024 11.9    • MPV 06/20/2024 12.4    • Platelets 06/20/2024 155    • nRBC 06/20/2024 0    • Segmented % 06/20/2024 59    • Immature Grans % 06/20/2024 0    • Lymphocytes % 06/20/2024 35    • Monocytes % 06/20/2024 5    • Eosinophils Relative 06/20/2024 1    • Basophils Relative 06/20/2024 0    • Absolute Neutrophils 06/20/2024 3.29    • Absolute Immature Grans 06/20/2024 0.02    • Absolute Lymphocytes 06/20/2024 2.00    • Absolute Monocytes 06/20/2024 0.28    • Eosinophils Absolute 06/20/2024 0.06    • Basophils Absolute 06/20/2024 0.02    • TSH 3RD GENERATON 06/20/2024 1.740          Impression:  6 lipomas symptomatic.  3 on the back.  2 on the abdomen.  1 on the right lower extremity.      Plan:  Plan schedule excision of these under local anesthesia with IV sedation at the earliest possible date.

## 2024-07-09 NOTE — H&P (VIEW-ONLY)
Chief Complaint: Multiple painful lipomas      History of Present Illness: Patient is a 29-year-old  male who speaks minimal English who presents the office today with his significant other Marlena.  He is here today f because he has multiple painful masses on his back and his abdomen.  He also has 1 on his right leg.  He was recently seen in the office of his PCP Chitra CARRASCO who examined him and felt visual lipomas.  She kindly referred him to our office for evaluation.    The patient states these masses are painful and with pressure they hurt more.      Past Medical History:   Past Medical History:   Diagnosis Date    Known health problems: none          Past Surgical History:    Past Surgical History:   Procedure Laterality Date    APPENDECTOMY LAPAROSCOPIC N/A 1/31/2023    Procedure: APPENDECTOMY LAPAROSCOPIC;  Surgeon: Rosemary Raya MD;  Location: [facility] OR;  Service: General    NO PAST SURGERIES           Allergies:  No Known Allergies      Medications:    Current Outpatient Medications:     magnesium Oxide (MAG-OX) 400 mg TABS, Take 1 tablet (400 mg total) by mouth 2 (two) times a day, Disp: 60 tablet, Rfl: 11    meloxicam (Mobic) 15 mg tablet, Take 1 tablet (15 mg total) by mouth daily, Disp: 30 tablet, Rfl: 0    Riboflavin 100 MG TABS, Take 2 tablets (200 mg total) by mouth 2 (two) times a day, Disp: 360 tablet, Rfl: 3      Social History:  Social History     Social History     Substance and Sexual Activity   Alcohol Use Yes    Comment: social     Social History     Substance and Sexual Activity   Drug Use Not Currently     Social History     Tobacco Use   Smoking Status Never   Smokeless Tobacco Never         Family History:  History reviewed. No pertinent family history.      Review of Systems:    Negative for 12 systems reviewed.    Vitals:  Vitals:    07/09/24 1240   BP: 112/80   Pulse: 78   Temp: 98.2 °F (36.8 °C)   SpO2: 97%       Physical Exam:  The patient is a  healthy-appearing young adult  male 5 foot 8 inches 201 pounds.  He is awake alert no distress.    Vital signs as above    Skin warm dry  Head normocephalic and atraumatic  Eyes PERRLA EOM intact  Ears and nose within normal limits  Throat gag reflex intact  Neck no masses thyromegaly lymphadenopathy palpable.  Back there are 3 small palpable lesions in the back that are tender consistent with lipomas.  Other than that he has no CVA or spinal tenderness.  Lungs clear to A&P  Cor regular in rhythm no murmurs or bruits  Abdomen flat firm there are 2 palpable lipomas noted on each flank freely movable and somewhat tender to palpation.  Is no other abdominal masses or hernias noted.  He has some's well-healed laparoscopic incisions consistent with a previous laparoscopic appendectomy.  Extremities on the right lower extremity laterally noted is a palpable lipoma that is tender to palpation.  Other than that he has no cyanosis clubbing or edema.  Neurologically ANO x 3 cranials 2-12 intact  Lymphatics no lymphadenopathy palpable.      Lab Results: I have personally reviewed pertinent reports. See below.  Imaging: I have personally reviewed pertinent reports.   EKG, Pathology, and Other Studies: I have personally reviewed pertinent reports.     No visits with results within 1 Day(s) from this visit.   Latest known visit with results is:   Appointment on 06/20/2024   Component Date Value    HIV-1 p24 Antigen 06/20/2024 Non-Reactive     HIV-1 Antibody 06/20/2024 Non-Reactive     HIV-2 Antibody 06/20/2024 Non-Reactive     HIV Ag-Ab 5th Gen 06/20/2024 Non-Reactive     Hepatitis C Ab 06/20/2024 Non-reactive     Cholesterol 06/20/2024 185     Triglycerides 06/20/2024 281 (H)     HDL, Direct 06/20/2024 37 (L)     LDL Calculated 06/20/2024 92     WBC 06/20/2024 5.67     RBC 06/20/2024 5.06     Hemoglobin 06/20/2024 15.2     Hematocrit 06/20/2024 45.1     MCV 06/20/2024 89     MCH 06/20/2024 30.0     MCHC 06/20/2024 33.7      RDW 06/20/2024 11.9     MPV 06/20/2024 12.4     Platelets 06/20/2024 155     nRBC 06/20/2024 0     Segmented % 06/20/2024 59     Immature Grans % 06/20/2024 0     Lymphocytes % 06/20/2024 35     Monocytes % 06/20/2024 5     Eosinophils Relative 06/20/2024 1     Basophils Relative 06/20/2024 0     Absolute Neutrophils 06/20/2024 3.29     Absolute Immature Grans 06/20/2024 0.02     Absolute Lymphocytes 06/20/2024 2.00     Absolute Monocytes 06/20/2024 0.28     Eosinophils Absolute 06/20/2024 0.06     Basophils Absolute 06/20/2024 0.02     TSH 3RD GENERATON 06/20/2024 1.740          Impression:  6 lipomas symptomatic.  3 on the back.  2 on the abdomen.  1 on the right lower extremity.      Plan:  Plan schedule excision of these under local anesthesia with IV sedation at the earliest possible date.

## 2024-07-09 NOTE — H&P
Chief Complaint: Multiple painful lipomas      History of Present Illness: Patient is a 29-year-old  male who speaks minimal English who presents the office today with his significant other Marlena.  He is here today f because he has multiple painful masses on his back and his abdomen.  He also has 1 on his right leg.  He was recently seen in the office of his PCP Chitra CARRASCO who examined him and felt visual lipomas.  She kindly referred him to our office for evaluation.    The patient states these masses are painful and with pressure they hurt more.      Past Medical History:   Past Medical History:   Diagnosis Date    Known health problems: none          Past Surgical History:    Past Surgical History:   Procedure Laterality Date    APPENDECTOMY LAPAROSCOPIC N/A 1/31/2023    Procedure: APPENDECTOMY LAPAROSCOPIC;  Surgeon: Rosemary Raya MD;  Location: [facility] OR;  Service: General    NO PAST SURGERIES           Allergies:  No Known Allergies      Medications:    Current Outpatient Medications:     magnesium Oxide (MAG-OX) 400 mg TABS, Take 1 tablet (400 mg total) by mouth 2 (two) times a day, Disp: 60 tablet, Rfl: 11    meloxicam (Mobic) 15 mg tablet, Take 1 tablet (15 mg total) by mouth daily, Disp: 30 tablet, Rfl: 0    Riboflavin 100 MG TABS, Take 2 tablets (200 mg total) by mouth 2 (two) times a day, Disp: 360 tablet, Rfl: 3      Social History:  Social History     Social History     Substance and Sexual Activity   Alcohol Use Yes    Comment: social     Social History     Substance and Sexual Activity   Drug Use Not Currently     Social History     Tobacco Use   Smoking Status Never   Smokeless Tobacco Never         Family History:  History reviewed. No pertinent family history.      Review of Systems:    Negative for 12 systems reviewed.    Vitals:  Vitals:    07/09/24 1240   BP: 112/80   Pulse: 78   Temp: 98.2 °F (36.8 °C)   SpO2: 97%       Physical Exam:  The patient is a  healthy-appearing young adult  male 5 foot 8 inches 201 pounds.  He is awake alert no distress.    Vital signs as above    Skin warm dry  Head normocephalic and atraumatic  Eyes PERRLA EOM intact  Ears and nose within normal limits  Throat gag reflex intact  Neck no masses thyromegaly lymphadenopathy palpable.  Back there are 3 small palpable lesions in the back that are tender consistent with lipomas.  Other than that he has no CVA or spinal tenderness.  Lungs clear to A&P  Cor regular in rhythm no murmurs or bruits  Abdomen flat firm there are 2 palpable lipomas noted on each flank freely movable and somewhat tender to palpation.  Is no other abdominal masses or hernias noted.  He has some's well-healed laparoscopic incisions consistent with a previous laparoscopic appendectomy.  Extremities on the right lower extremity laterally noted is a palpable lipoma that is tender to palpation.  Other than that he has no cyanosis clubbing or edema.  Neurologically ANO x 3 cranials 2-12 intact  Lymphatics no lymphadenopathy palpable.      Lab Results: I have personally reviewed pertinent reports. See below.  Imaging: I have personally reviewed pertinent reports.   EKG, Pathology, and Other Studies: I have personally reviewed pertinent reports.     No visits with results within 1 Day(s) from this visit.   Latest known visit with results is:   Appointment on 06/20/2024   Component Date Value    HIV-1 p24 Antigen 06/20/2024 Non-Reactive     HIV-1 Antibody 06/20/2024 Non-Reactive     HIV-2 Antibody 06/20/2024 Non-Reactive     HIV Ag-Ab 5th Gen 06/20/2024 Non-Reactive     Hepatitis C Ab 06/20/2024 Non-reactive     Cholesterol 06/20/2024 185     Triglycerides 06/20/2024 281 (H)     HDL, Direct 06/20/2024 37 (L)     LDL Calculated 06/20/2024 92     WBC 06/20/2024 5.67     RBC 06/20/2024 5.06     Hemoglobin 06/20/2024 15.2     Hematocrit 06/20/2024 45.1     MCV 06/20/2024 89     MCH 06/20/2024 30.0     MCHC 06/20/2024 33.7      RDW 06/20/2024 11.9     MPV 06/20/2024 12.4     Platelets 06/20/2024 155     nRBC 06/20/2024 0     Segmented % 06/20/2024 59     Immature Grans % 06/20/2024 0     Lymphocytes % 06/20/2024 35     Monocytes % 06/20/2024 5     Eosinophils Relative 06/20/2024 1     Basophils Relative 06/20/2024 0     Absolute Neutrophils 06/20/2024 3.29     Absolute Immature Grans 06/20/2024 0.02     Absolute Lymphocytes 06/20/2024 2.00     Absolute Monocytes 06/20/2024 0.28     Eosinophils Absolute 06/20/2024 0.06     Basophils Absolute 06/20/2024 0.02     TSH 3RD GENERATON 06/20/2024 1.740          Impression:  6 lipomas symptomatic.  3 on the back.  2 on the abdomen.  1 on the right lower extremity.      Plan:  Plan schedule excision of these under local anesthesia with IV sedation at the earliest possible date.

## 2024-07-10 ENCOUNTER — TELEPHONE (OUTPATIENT)
Age: 29
End: 2024-07-10

## 2024-07-10 NOTE — TELEPHONE ENCOUNTER
Patients spouse called in to speak with surgical coordinator. Advised caller the surgical coordinator was not available at the moment. Please call her back as this is her fourth call today. [phone].

## 2024-07-10 NOTE — TELEPHONE ENCOUNTER
Patient's spouse called back again, tried to warm tx to [place] but got her VM.  She says he needs this info before he goes to work today at 2:30.  Please give her a call at [phone]

## 2024-07-10 NOTE — TELEPHONE ENCOUNTER
Patient's spouse is requesting to speak to the surgery coordinator regarding how many days he will be out from his surgery. Warm transferred caller to surgical coordinator's voicemail to leave a detailed message. Please call the patient's spouse back at [phone] to advise.

## 2024-07-10 NOTE — TELEPHONE ENCOUNTER
Pt's Spouse calling back again to speak with surgery coordinator. Warm transfer the call and got VM. Pt's spouse is requesting call back. Thank you

## 2024-07-18 RX ORDER — ACETAMINOPHEN 500 MG
1000 TABLET ORAL EVERY 6 HOURS PRN
COMMUNITY

## 2024-07-18 NOTE — PRE-PROCEDURE INSTRUCTIONS
Pre-Surgery Instructions:   Medication Instructions    acetaminophen (TYLENOL) 500 mg tablet Uses PRN- OK to take day of surgery    magnesium Oxide (MAG-OX) 400 mg TABS Hold day of surgery.    meloxicam (Mobic) 15 mg tablet Pt instructed to stop until after surgery    Riboflavin 100 MG TABS Hold day of surgery.   Medication instructions for day surgery reviewed. Please use only a sip of water to take your instructed medications. Avoid all over the counter vitamins, supplements and NSAIDS for one week prior to surgery per anesthesia guidelines. Tylenol is ok to take as needed.     You will receive a call one business day prior to surgery with an arrival time and hospital directions. If your surgery is scheduled on a Monday, the hospital will be calling you on the Friday prior to your surgery. If you have not heard from anyone by 8pm, please call the hospital supervisor through the hospital  at [phone]. ([place] [phone] or [place] [phone]).    Do not eat or drink anything after midnight the night before your surgery, including candy, mints, lifesavers, or chewing gum. Do not drink alcohol 24hrs before your surgery. Try not to smoke at least 24hrs before your surgery.       Follow the pre surgery showering instructions as listed in the “My Surgical Experience Booklet” or otherwise provided by your surgeon's office. Do not use a blade to shave the surgical area 1 week before surgery. It is okay to use a clean electric clippers up to 24 hours before surgery. Do not apply any lotions, creams, including makeup, cologne, deodorant, or perfumes after showering on the day of your surgery. Do not use dry shampoo, hair spray, hair gel, or any type of hair products.     No contact lenses, eye make-up, or artificial eyelashes. Remove nail polish, including gel polish, and any artificial, gel, or acrylic nails if possible. Remove all jewelry including rings and body piercing jewelry.     Wear causal  clothing that is easy to take on and off. Consider your type of surgery.    Keep any valuables, jewelry, piercings at home. Please bring any specially ordered equipment (sling, braces) if indicated.    Arrange for a responsible person to drive you to and from the hospital on the day of your surgery. Please confirm the visitor policy for the day of your procedure when you receive your phone call with an arrival time.     Call the surgeon's office with any new illnesses, exposures, or additional questions prior to surgery.    Please reference your “My Surgical Experience Booklet” for additional information to prepare for your upcoming surgery.

## 2024-07-22 ENCOUNTER — ANESTHESIA EVENT (OUTPATIENT)
Dept: PERIOP | Facility: HOSPITAL | Age: 29
End: 2024-07-22
Payer: COMMERCIAL

## 2024-07-22 ENCOUNTER — ANESTHESIA (OUTPATIENT)
Dept: PERIOP | Facility: HOSPITAL | Age: 29
End: 2024-07-22
Payer: COMMERCIAL

## 2024-07-22 ENCOUNTER — HOSPITAL ENCOUNTER (OUTPATIENT)
Facility: HOSPITAL | Age: 29
Setting detail: OUTPATIENT SURGERY
Discharge: HOME | End: 2024-07-22
Attending: SPECIALIST | Admitting: SPECIALIST
Payer: COMMERCIAL

## 2024-07-22 VITALS
WEIGHT: 198 LBS | BODY MASS INDEX: 30.11 KG/M2 | TEMPERATURE: 96.5 F | DIASTOLIC BLOOD PRESSURE: 73 MMHG | HEART RATE: 66 BPM | RESPIRATION RATE: 16 BRPM | OXYGEN SATURATION: 100 % | SYSTOLIC BLOOD PRESSURE: 104 MMHG

## 2024-07-22 DIAGNOSIS — D17.1 ABDOMINAL LIPOMA: Primary | ICD-10-CM

## 2024-07-22 DIAGNOSIS — D17.1 LIPOMA OF BACK: ICD-10-CM

## 2024-07-22 DIAGNOSIS — D17.20 LIPOMA OF LOWER LEG: ICD-10-CM

## 2024-07-22 DIAGNOSIS — D17.9 MULTIPLE LIPOMAS: ICD-10-CM

## 2024-07-22 PROCEDURE — 11404 EXC TR-EXT B9+MARG 3.1-4 CM: CPT | Performed by: SPECIALIST

## 2024-07-22 PROCEDURE — 12035 INTMD RPR S/A/T/EXT 12.6-20: CPT | Performed by: SPECIALIST

## 2024-07-22 PROCEDURE — 11403 EXC TR-EXT B9+MARG 2.1-3CM: CPT | Performed by: SPECIALIST

## 2024-07-22 PROCEDURE — 11402 EXC TR-EXT B9+MARG 1.1-2 CM: CPT | Performed by: SPECIALIST

## 2024-07-22 PROCEDURE — 88304 TISSUE EXAM BY PATHOLOGIST: CPT | Performed by: PATHOLOGY

## 2024-07-22 RX ORDER — PROPOFOL 10 MG/ML
INJECTION, EMULSION INTRAVENOUS CONTINUOUS PRN
Status: DISCONTINUED | OUTPATIENT
Start: 2024-07-22 | End: 2024-07-22

## 2024-07-22 RX ORDER — SODIUM CHLORIDE, SODIUM LACTATE, POTASSIUM CHLORIDE, CALCIUM CHLORIDE 600; 310; 30; 20 MG/100ML; MG/100ML; MG/100ML; MG/100ML
125 INJECTION, SOLUTION INTRAVENOUS CONTINUOUS
Status: DISCONTINUED | OUTPATIENT
Start: 2024-07-22 | End: 2024-07-22 | Stop reason: HOSPADM

## 2024-07-22 RX ORDER — FENTANYL CITRATE 50 UG/ML
INJECTION, SOLUTION INTRAMUSCULAR; INTRAVENOUS AS NEEDED
Status: DISCONTINUED | OUTPATIENT
Start: 2024-07-22 | End: 2024-07-22

## 2024-07-22 RX ORDER — FENTANYL CITRATE/PF 50 MCG/ML
25 SYRINGE (ML) INJECTION
Status: DISCONTINUED | OUTPATIENT
Start: 2024-07-22 | End: 2024-07-22 | Stop reason: HOSPADM

## 2024-07-22 RX ORDER — LIDOCAINE HYDROCHLORIDE 10 MG/ML
INJECTION, SOLUTION EPIDURAL; INFILTRATION; INTRACAUDAL; PERINEURAL AS NEEDED
Status: DISCONTINUED | OUTPATIENT
Start: 2024-07-22 | End: 2024-07-22

## 2024-07-22 RX ORDER — OXYCODONE HYDROCHLORIDE AND ACETAMINOPHEN 5; 325 MG/1; MG/1
1 TABLET ORAL EVERY 6 HOURS PRN
Qty: 20 TABLET | Refills: 0 | Status: SHIPPED | OUTPATIENT
Start: 2024-07-22 | End: 2024-08-01

## 2024-07-22 RX ORDER — OXYCODONE HYDROCHLORIDE AND ACETAMINOPHEN 5; 325 MG/1; MG/1
1 TABLET ORAL EVERY 4 HOURS PRN
Status: DISCONTINUED | OUTPATIENT
Start: 2024-07-22 | End: 2024-07-22 | Stop reason: HOSPADM

## 2024-07-22 RX ORDER — HYDROMORPHONE HCL IN WATER/PF 6 MG/30 ML
0.2 PATIENT CONTROLLED ANALGESIA SYRINGE INTRAVENOUS
Status: DISCONTINUED | OUTPATIENT
Start: 2024-07-22 | End: 2024-07-22 | Stop reason: HOSPADM

## 2024-07-22 RX ORDER — ACETAMINOPHEN 10 MG/ML
INJECTION, SOLUTION INTRAVENOUS AS NEEDED
Status: DISCONTINUED | OUTPATIENT
Start: 2024-07-22 | End: 2024-07-22

## 2024-07-22 RX ORDER — ONDANSETRON 2 MG/ML
4 INJECTION INTRAMUSCULAR; INTRAVENOUS ONCE AS NEEDED
Status: DISCONTINUED | OUTPATIENT
Start: 2024-07-22 | End: 2024-07-22 | Stop reason: HOSPADM

## 2024-07-22 RX ORDER — LIDOCAINE HYDROCHLORIDE 10 MG/ML
INJECTION, SOLUTION EPIDURAL; INFILTRATION; INTRACAUDAL; PERINEURAL AS NEEDED
Status: DISCONTINUED | OUTPATIENT
Start: 2024-07-22 | End: 2024-07-22 | Stop reason: HOSPADM

## 2024-07-22 RX ORDER — ONDANSETRON 2 MG/ML
4 INJECTION INTRAMUSCULAR; INTRAVENOUS EVERY 8 HOURS PRN
Status: DISCONTINUED | OUTPATIENT
Start: 2024-07-22 | End: 2024-07-22 | Stop reason: HOSPADM

## 2024-07-22 RX ORDER — PROPOFOL 10 MG/ML
INJECTION, EMULSION INTRAVENOUS AS NEEDED
Status: DISCONTINUED | OUTPATIENT
Start: 2024-07-22 | End: 2024-07-22

## 2024-07-22 RX ORDER — KETOROLAC TROMETHAMINE 30 MG/ML
INJECTION, SOLUTION INTRAMUSCULAR; INTRAVENOUS AS NEEDED
Status: DISCONTINUED | OUTPATIENT
Start: 2024-07-22 | End: 2024-07-22

## 2024-07-22 RX ORDER — MAGNESIUM HYDROXIDE 1200 MG/15ML
LIQUID ORAL AS NEEDED
Status: DISCONTINUED | OUTPATIENT
Start: 2024-07-22 | End: 2024-07-22 | Stop reason: HOSPADM

## 2024-07-22 RX ORDER — IBUPROFEN 600 MG/1
600 TABLET ORAL EVERY 6 HOURS PRN
Status: DISCONTINUED | OUTPATIENT
Start: 2024-07-22 | End: 2024-07-22 | Stop reason: HOSPADM

## 2024-07-22 RX ORDER — BUPIVACAINE HYDROCHLORIDE 5 MG/ML
INJECTION, SOLUTION EPIDURAL; INTRACAUDAL AS NEEDED
Status: DISCONTINUED | OUTPATIENT
Start: 2024-07-22 | End: 2024-07-22 | Stop reason: HOSPADM

## 2024-07-22 RX ORDER — ALBUTEROL SULFATE 2.5 MG/3ML
2.5 SOLUTION RESPIRATORY (INHALATION) ONCE AS NEEDED
Status: DISCONTINUED | OUTPATIENT
Start: 2024-07-22 | End: 2024-07-22 | Stop reason: HOSPADM

## 2024-07-22 RX ORDER — MIDAZOLAM HYDROCHLORIDE 2 MG/2ML
INJECTION, SOLUTION INTRAMUSCULAR; INTRAVENOUS AS NEEDED
Status: DISCONTINUED | OUTPATIENT
Start: 2024-07-22 | End: 2024-07-22

## 2024-07-22 RX ADMIN — SODIUM CHLORIDE, SODIUM LACTATE, POTASSIUM CHLORIDE, AND CALCIUM CHLORIDE: .6; .31; .03; .02 INJECTION, SOLUTION INTRAVENOUS at 11:19

## 2024-07-22 RX ADMIN — FENTANYL CITRATE 50 MCG: 50 INJECTION, SOLUTION INTRAMUSCULAR; INTRAVENOUS at 11:23

## 2024-07-22 RX ADMIN — KETOROLAC TROMETHAMINE 30 MG: 30 INJECTION, SOLUTION INTRAMUSCULAR; INTRAVENOUS at 13:35

## 2024-07-22 RX ADMIN — PROPOFOL 60 MG: 10 INJECTION, EMULSION INTRAVENOUS at 11:24

## 2024-07-22 RX ADMIN — MIDAZOLAM 2 MG: 1 INJECTION INTRAMUSCULAR; INTRAVENOUS at 11:19

## 2024-07-22 RX ADMIN — ACETAMINOPHEN 1000 MG: 10 INJECTION INTRAVENOUS at 11:50

## 2024-07-22 RX ADMIN — SODIUM CHLORIDE, SODIUM LACTATE, POTASSIUM CHLORIDE, AND CALCIUM CHLORIDE: .6; .31; .03; .02 INJECTION, SOLUTION INTRAVENOUS at 12:22

## 2024-07-22 RX ADMIN — PROPOFOL 130 MCG/KG/MIN: 10 INJECTION, EMULSION INTRAVENOUS at 11:24

## 2024-07-22 RX ADMIN — FENTANYL CITRATE 50 MCG: 50 INJECTION, SOLUTION INTRAMUSCULAR; INTRAVENOUS at 11:42

## 2024-07-22 RX ADMIN — LIDOCAINE HYDROCHLORIDE 50 MG: 10 INJECTION, SOLUTION EPIDURAL; INFILTRATION; INTRACAUDAL; PERINEURAL at 11:24

## 2024-07-22 NOTE — OP NOTE
OPERATIVE REPORT  PATIENT NAME: Wing Stein    :  1995  MRN: [id number]  Pt Location:  OR ROOM 07    SURGERY DATE: 2024    Surgeons and Role:     * Marty Fraser MD - Primary    Preop Diagnosis:  Multiple lipomas [D17.9]  Abdominal lipoma [D17.1]  Lipoma of back [D17.1]  Lipoma of lower leg [D17.20]    Post-Op Diagnosis Codes:     * Multiple lipomas [D17.9]     * Abdominal lipoma [D17.1]     * Lipoma of back [D17.1]     * Lipoma of lower leg [D17.20]    Procedure(s):  EXCISION MULTIPLE LIPOMAS THREE BACK. TWO ABDOMEN AND ONE RIGHT LEG  Left lower back 2.75 cm x 1 cm x 1 cm  Lower mid back 2 cm x 1 cm x 1 cm  Right lower back 2.5 cm x 1 cm x 0.5 cm  Right abdomen 3.5 cm x 2.5 cm x 1 cm  Left abdomen 1.5 cm x 1.5 cm x 1.5 cm  Right leg 2 cm X 2 cm x 0.5 cm    Specimen(s):  ID Type Source Tests Collected by Time Destination   1 : multiple lipomas of back. abdomen, and right leg Tissue Soft Tissue, Other TISSUE EXAM Marty Fraser MD 2024 12:04 PM        Estimated Blood Loss:   Minimal    Drains:  * No LDAs found *    Anesthesia Type:   IV Sedation with Anesthesia    Operative Indications:  Multiple lipomas [D17.9]  Abdominal lipoma [D17.1]  Lipoma of back [D17.1]  Lipoma of lower leg [D17.20]      Operative Findings:  All lipomas were in the subcutaneous tissue         Complications:   None    Procedure and Technique:  The patient had been seen in the holding area and the lipomas were marked.  He was brought to the operating room placed the operative table in a right lateral decubitus position.  Starting with the back 3 lipomas were infiltrated with 1% lidocaine.  Transverse incision was made with 15 scalpel blade.  The lipomas were dissected in the surrounding tissue using a Metzenbaum scissors.  They were delivered into the wound and measured individually.  Bleeding was controlled by electrocautery.  All the incisions were infiltrated with half percent Marcaine.  Closed with 3-0 Vicryl  and subcutaneous tissue.  Skin closed 4 Monocryl in a running subicular fashion.  Benzoin and Steri-Strips applied.    At that point the patient was rotateD and placed on the operating table in the supine position.  The abdominal lipomas were infiltrated with 1% lidocaine.  Also the lipoma on the right lower extremity was infiltrated.  Transverse incisions were made over each lipoma with a 15 blade.  Each lipoma was dissected and surrounding tissue using the Metzenbaum scissors.  They were all brought out of the incision and eventually sent to pathology for histological diagnosis.  Any bleeding was controlled with electrocautery.  Half percent Marcaine was infiltrated in the wounds.  3-0 Vicryl was placed in the subcutaneous tissue.  The skin is closed with 4 Monocryl in a subicular fashion.  Benzoin and Steri-Strips were applied.  The estimated blood loss was minimal patient tolerated procedure well delivered to the covering stable condition.  Thanks.   I was present for the entire procedure.    Patient Disposition:  PACU     This procedure was not performed to treat primary cutaneous melanoma through wide local excision      SIGNATURE: Marty Fraser MD  DATE: July 22, 2024  TIME: 2:07 PM

## 2024-07-22 NOTE — ANESTHESIA POSTPROCEDURE EVALUATION
Post-Op Assessment Note    CV Status:  Stable    Pain management: adequate       Mental Status:  Awake   Hydration Status:  Stable   PONV Controlled:  Controlled   Airway Patency:  Patent     Post Op Vitals Reviewed: Yes    No anethesia notable event occurred.    Staff: Anesthesiologist, CRNA               BP   128/68   Temp   97.1   Pulse  67   Resp   18   SpO2   98

## 2024-07-22 NOTE — INTERVAL H&P NOTE
H&P reviewed. After examining the patient I find no changes in the patients condition since the H&P had been written.    Vitals:    07/22/24 0938   BP: 121/87   Pulse: 78   Resp: 18   Temp: (!) 97.4 °F (36.3 °C)   SpO2: 96%

## 2024-07-22 NOTE — ANESTHESIA PREPROCEDURE EVALUATION
Procedure:  EXCISION MULTIPLE LIPOMAS THREE BACK, TWO ABDOMEN AND ONE RIGHT LEG (Abdomen)    Relevant Problems   NEURO/PSYCH   (+) Chronic tension-type headache, not intractable      PULMONARY   (+) Smoking        Physical Exam    Airway    Mallampati score: II  TM Distance: >3 FB  Neck ROM: full     Dental   No notable dental hx     Cardiovascular  Cardiovascular exam normal    Pulmonary  Pulmonary exam normal     Other Findings        Anesthesia Plan  ASA Score- 2     Anesthesia Type- IV sedation with anesthesia with ASA Monitors.         Additional Monitors:     Airway Plan: ETT.           Plan Factors-Exercise tolerance (METS): >4 METS.    Chart reviewed.   Existing labs reviewed.     Patient is a current smoker.  Patient instructed to abstain from smoking on day of procedure. Patient did not smoke on day of surgery.            Induction- intravenous.    Postoperative Plan-         Informed Consent- Anesthetic plan and risks discussed with patient.  I personally reviewed this patient with the CRNA. Discussed and agreed on the Anesthesia Plan with the CRNA..

## 2024-07-23 NOTE — PRE-PROCEDURE INSTRUCTIONS
Pre-Surgery Instructions:   Medication Instructions    acetaminophen (TYLENOL) 500 mg tablet Uses PRN- OK to take day of surgery   screening call with assistance of [employer]  #[phone].     Medication instructions for day surgery reviewed. Please use only a sip of water to take your instructed medications. Avoid all over the counter vitamins, supplements and NSAIDS for one week prior to surgery per anesthesia guidelines. Tylenol is ok to take as needed.     You will receive a call one business day prior to surgery with an arrival time and hospital directions. If your surgery is scheduled on a Monday, the hospital will be calling you on the Friday prior to your surgery. If you have not heard from anyone by 8pm, please call the hospital supervisor through the hospital  at [phone]. ([place] [phone] or [place] [phone]).    Do not eat or drink anything after midnight the night before your surgery, including candy, mints, lifesavers, or chewing gum. Do not drink alcohol 24hrs before your surgery. Try not to smoke at least 24hrs before your surgery.       Follow the pre surgery showering instructions as listed in the “My Surgical Experience Booklet” or otherwise provided by your surgeon's office. Do not use a blade to shave the surgical area 1 week before surgery. It is okay to use a clean electric clippers up to 24 hours before surgery. Do not apply any lotions, creams, including makeup, cologne, deodorant, or perfumes after showering on the day of your surgery. Do not use dry shampoo, hair spray, hair gel, or any type of hair products.     No contact lenses, eye make-up, or artificial eyelashes. Remove nail polish, including gel polish, and any artificial, gel, or acrylic nails if possible. Remove all jewelry including rings and body piercing jewelry.     Wear causal clothing that is easy to take on and off. Consider your type of surgery.    Keep any valuables, jewelry, piercings at home.  Please bring any specially ordered equipment (sling, braces) if indicated.    Arrange for a responsible person to drive you to and from the hospital on the day of your surgery. Please confirm the visitor policy for the day of your procedure when you receive your phone call with an arrival time.     Call the surgeon's office with any new illnesses, exposures, or additional questions prior to surgery.    Please reference your “My Surgical Experience Booklet” for additional information to prepare for your upcoming surgery.

## 2024-07-25 ENCOUNTER — TELEPHONE (OUTPATIENT)
Age: 29
End: 2024-07-25

## 2024-07-25 PROCEDURE — 88304 TISSUE EXAM BY PATHOLOGIST: CPT | Performed by: PATHOLOGY

## 2024-07-25 NOTE — TELEPHONE ENCOUNTER
Patient called to reschedule his pre-op clearance appointment from yesterday due to the surgery being tomorrow.  Patient was advised that at this time there are no appointments for today and the provider will need to be consulted to squeeze him in for an appointment.  Please advise and return patients call.    Pre-op clerance for a left foot BUNIONECTOMY with Dr. Gaston Jain on 7/26/2024

## 2024-07-25 NOTE — TELEPHONE ENCOUNTER
Anisha mejias/[facility] called requesting confirmation that pt was seen at his scheduled PreOp appt 7/24/24 randell/[facility].    Confirmed w/Radha in clerical that pt was a no-show for his PreOp appt.    Anisha thanks us for clarifying.

## 2024-07-25 NOTE — TELEPHONE ENCOUNTER
Called pt today in regards of Pre-Op, pt had called surgeon prior and reschedule surgery for Aug 5th, 2024, patient Pre-Op appt will be with Provider Tequila Guzman on July 31st, 2024 at 10:00 AM.     Thank you.   Qing.

## 2024-07-29 PROBLEM — F17.200 SMOKING: Status: RESOLVED | Noted: 2023-01-31 | Resolved: 2024-07-29

## 2024-07-31 ENCOUNTER — APPOINTMENT (OUTPATIENT)
Dept: LAB | Facility: CLINIC | Age: 29
End: 2024-07-31
Payer: COMMERCIAL

## 2024-07-31 ENCOUNTER — CONSULT (OUTPATIENT)
Dept: FAMILY MEDICINE CLINIC | Facility: CLINIC | Age: 29
End: 2024-07-31
Payer: COMMERCIAL

## 2024-07-31 VITALS
HEIGHT: 68 IN | BODY MASS INDEX: 30.55 KG/M2 | TEMPERATURE: 97.8 F | DIASTOLIC BLOOD PRESSURE: 82 MMHG | OXYGEN SATURATION: 96 % | WEIGHT: 201.6 LBS | SYSTOLIC BLOOD PRESSURE: 108 MMHG | HEART RATE: 84 BPM

## 2024-07-31 DIAGNOSIS — Z01.818 PRE-OP EXAMINATION: Primary | ICD-10-CM

## 2024-07-31 DIAGNOSIS — Z51.81 ENCOUNTER FOR MONITORING NSAID THERAPY: ICD-10-CM

## 2024-07-31 DIAGNOSIS — Z79.1 ENCOUNTER FOR MONITORING NSAID THERAPY: ICD-10-CM

## 2024-07-31 DIAGNOSIS — M21.612 BUNION OF LEFT FOOT: ICD-10-CM

## 2024-07-31 DIAGNOSIS — M79.672 LEFT FOOT PAIN: ICD-10-CM

## 2024-07-31 DIAGNOSIS — Z01.818 PRE-OP EXAMINATION: ICD-10-CM

## 2024-07-31 LAB
ANION GAP SERPL CALCULATED.3IONS-SCNC: 10 MMOL/L (ref 4–13)
APTT PPP: 30 SECONDS (ref 23–37)
BACTERIA UR QL AUTO: ABNORMAL /HPF
BILIRUB UR QL STRIP: NEGATIVE
BUN SERPL-MCNC: 11 MG/DL (ref 5–25)
CALCIUM SERPL-MCNC: 9.3 MG/DL (ref 8.4–10.2)
CAOX CRY URNS QL MICRO: ABNORMAL /HPF
CHLORIDE SERPL-SCNC: 105 MMOL/L (ref 96–108)
CLARITY UR: CLEAR
CO2 SERPL-SCNC: 27 MMOL/L (ref 21–32)
COLOR UR: YELLOW
CREAT SERPL-MCNC: 1.13 MG/DL (ref 0.6–1.3)
ERYTHROCYTE [DISTWIDTH] IN BLOOD BY AUTOMATED COUNT: 12.1 % (ref 11.6–15.1)
GFR SERPL CREATININE-BSD FRML MDRD: 87 ML/MIN/1.73SQ M
GLUCOSE P FAST SERPL-MCNC: 109 MG/DL (ref 65–99)
GLUCOSE UR STRIP-MCNC: NEGATIVE MG/DL
HCT VFR BLD AUTO: 44.3 % (ref 36.5–49.3)
HGB BLD-MCNC: 15 G/DL (ref 12–17)
HGB UR QL STRIP.AUTO: NEGATIVE
INR PPP: 0.99 (ref 0.84–1.19)
KETONES UR STRIP-MCNC: ABNORMAL MG/DL
LEUKOCYTE ESTERASE UR QL STRIP: ABNORMAL
MCH RBC QN AUTO: 30.1 PG (ref 26.8–34.3)
MCHC RBC AUTO-ENTMCNC: 33.9 G/DL (ref 31.4–37.4)
MCV RBC AUTO: 89 FL (ref 82–98)
NITRITE UR QL STRIP: NEGATIVE
NON-SQ EPI CELLS URNS QL MICRO: ABNORMAL /HPF
PH UR STRIP.AUTO: 6 [PH]
PLATELET # BLD AUTO: 198 THOUSANDS/UL (ref 149–390)
PMV BLD AUTO: 12.2 FL (ref 8.9–12.7)
POTASSIUM SERPL-SCNC: 3.8 MMOL/L (ref 3.5–5.3)
PROT UR STRIP-MCNC: ABNORMAL MG/DL
PROTHROMBIN TIME: 13 SECONDS (ref 11.6–14.5)
RBC # BLD AUTO: 4.98 MILLION/UL (ref 3.88–5.62)
RBC #/AREA URNS AUTO: ABNORMAL /HPF
SODIUM SERPL-SCNC: 142 MMOL/L (ref 135–147)
SP GR UR STRIP.AUTO: 1.03 (ref 1–1.03)
UROBILINOGEN UR STRIP-ACNC: <2 MG/DL
WBC # BLD AUTO: 6.3 THOUSAND/UL (ref 4.31–10.16)
WBC #/AREA URNS AUTO: ABNORMAL /HPF

## 2024-07-31 PROCEDURE — 85027 COMPLETE CBC AUTOMATED: CPT

## 2024-07-31 PROCEDURE — 85730 THROMBOPLASTIN TIME PARTIAL: CPT

## 2024-07-31 PROCEDURE — 81001 URINALYSIS AUTO W/SCOPE: CPT

## 2024-07-31 PROCEDURE — 3725F SCREEN DEPRESSION PERFORMED: CPT | Performed by: FAMILY MEDICINE

## 2024-07-31 PROCEDURE — 85610 PROTHROMBIN TIME: CPT

## 2024-07-31 PROCEDURE — 93000 ELECTROCARDIOGRAM COMPLETE: CPT | Performed by: FAMILY MEDICINE

## 2024-07-31 PROCEDURE — 80048 BASIC METABOLIC PNL TOTAL CA: CPT

## 2024-07-31 PROCEDURE — 36415 COLL VENOUS BLD VENIPUNCTURE: CPT

## 2024-07-31 PROCEDURE — 99214 OFFICE O/P EST MOD 30 MIN: CPT | Performed by: FAMILY MEDICINE

## 2024-07-31 NOTE — PROGRESS NOTES
Ambulatory Visit  Name: Wing Stein      : 1995      MRN: [id number]  Encounter Provider: Julian Jones DO  Encounter Date: 2024   Encounter department: [facility]    1.  Preoperative clearance  Patient seen examined chart reviewed  EKG completed was normal  Will check CBC, BMP, PT, PTT, and UA today  2.  Left foot pain  3.  Hallux valgus/bunion left foot  For surgical correction 2024 at [facility] with podiatrist, Dr. Jain  4.  NSAID therapy I recommend patient not use his meloxicam or diclofenac till after surgery patient may use Tylenol  5.  Return as needed      Assessment & Plan   1. Pre-op examination  -     POCT ECG  -     Basic metabolic panel; Future; Expected date: 2024  -     CBC; Future; Expected date: 2024  -     UA (URINE) with reflex to Scope; Future; Expected date: 2024  -     Protime-INR; Future; Expected date: 2024  -     APTT; Future; Expected date: 2024  2. Left foot pain  -     Basic metabolic panel; Future; Expected date: 2024  -     CBC; Future; Expected date: 2024  -     UA (URINE) with reflex to Scope; Future; Expected date: 2024  -     Protime-INR; Future; Expected date: 2024  -     APTT; Future; Expected date: 2024  3. Bunion of left foot  Assessment & Plan:  For surgery 2024 with Dr. Jain  4. Encounter for monitoring NSAID therapy  -     Basic metabolic panel; Future; Expected date: 2024  -     CBC; Future; Expected date: 2024  -     UA (URINE) with reflex to Scope; Future; Expected date: 2024  -     Protime-INR; Future; Expected date: 2024  -     APTT; Future; Expected date: 2024      Depression Screening and Follow-up Plan: Patient was screened for depression during today's encounter. They screened negative with a PHQ-2 score of 0.      History of Present Illness     Patient is having left foot pain secondary  "to bunion.  Patient scheduled for surgery at [facility] with podiatry, Dr. Jain.  Patient is [place]-speaking interpretive service was used without difficulty        Review of Systems   Constitutional: Negative.    HENT: Negative.     Eyes: Negative.    Respiratory: Negative.     Cardiovascular: Negative.    Gastrointestinal: Negative.    Endocrine: Negative.    Genitourinary: Negative.    Musculoskeletal:         HPI   Skin: Negative.    Allergic/Immunologic: Negative.    Neurological: Negative.    Hematological: Negative.    Psychiatric/Behavioral: Negative.         Objective     /82 (BP Location: Right arm, Patient Position: Sitting, Cuff Size: Adult)   Pulse 84   Temp 97.8 °F (36.6 °C) (Tympanic)   Ht 5' 8\" (1.727 m)   Wt 91.4 kg (201 lb 9.6 oz)   SpO2 96%   BMI 30.65 kg/m²     Physical Exam  Vitals and nursing note reviewed.   Constitutional:       Appearance: Normal appearance.   HENT:      Head: Normocephalic and atraumatic.      Right Ear: Tympanic membrane normal.      Left Ear: Tympanic membrane normal.      Nose: Nose normal.      Mouth/Throat:      Mouth: Mucous membranes are moist.      Pharynx: Oropharynx is clear. No oropharyngeal exudate or posterior oropharyngeal erythema.   Eyes:      General: No scleral icterus.        Right eye: No discharge.         Left eye: No discharge.      Extraocular Movements: Extraocular movements intact.      Conjunctiva/sclera: Conjunctivae normal.      Pupils: Pupils are equal, round, and reactive to light.   Neck:      Vascular: No carotid bruit.   Cardiovascular:      Rate and Rhythm: Normal rate and regular rhythm.      Heart sounds: Normal heart sounds.   Pulmonary:      Effort: Pulmonary effort is normal.      Breath sounds: Normal breath sounds.   Abdominal:      General: Bowel sounds are normal.      Palpations: Abdomen is soft.      Tenderness: There is no abdominal tenderness.   Musculoskeletal:         General: Tenderness " and deformity present.      Cervical back: Neck supple.      Right lower leg: No edema.      Left lower leg: No edema.      Comments: Left hallux valgus mildly tender   Lymphadenopathy:      Cervical: No cervical adenopathy.   Skin:     General: Skin is warm and dry.   Neurological:      General: No focal deficit present.      Mental Status: He is alert.   Psychiatric:         Mood and Affect: Mood normal.       Administrative Statements

## 2024-07-31 NOTE — PATIENT INSTRUCTIONS
Complete blood work today for surgery  Do not use your meloxicam or diclofenac till after surgery  You may use your Tylenol if needed

## 2024-08-01 ENCOUNTER — TELEPHONE (OUTPATIENT)
Dept: FAMILY MEDICINE CLINIC | Facility: CLINIC | Age: 29
End: 2024-08-01

## 2024-08-01 NOTE — TELEPHONE ENCOUNTER
----- Message from Julian Jones DO sent at 8/1/2024 10:39 AM EDT -----  Patient's preoperative blood work is normal for nonfasting.  Patient is fully cleared for surgery.  Patient does have some trace leukocyte protein and ketones in his urine usually this is dehydration.  Patient should manage pain good hydration during the summer months

## 2024-08-03 ENCOUNTER — ANESTHESIA EVENT (OUTPATIENT)
Dept: PERIOP | Facility: HOSPITAL | Age: 29
End: 2024-08-03
Payer: COMMERCIAL

## 2024-08-05 ENCOUNTER — HOSPITAL ENCOUNTER (OUTPATIENT)
Facility: HOSPITAL | Age: 29
Setting detail: OUTPATIENT SURGERY
Discharge: HOME | End: 2024-08-05
Attending: PODIATRIST | Admitting: PODIATRIST
Payer: COMMERCIAL

## 2024-08-05 ENCOUNTER — APPOINTMENT (OUTPATIENT)
Dept: RADIOLOGY | Facility: HOSPITAL | Age: 29
End: 2024-08-05
Payer: COMMERCIAL

## 2024-08-05 ENCOUNTER — ANESTHESIA (OUTPATIENT)
Dept: PERIOP | Facility: HOSPITAL | Age: 29
End: 2024-08-05
Payer: COMMERCIAL

## 2024-08-05 RX ORDER — LIDOCAINE HYDROCHLORIDE 10 MG/ML
INJECTION, SOLUTION EPIDURAL; INFILTRATION; INTRACAUDAL; PERINEURAL AS NEEDED
Status: DISCONTINUED | OUTPATIENT
Start: 2024-08-05 | End: 2024-08-05

## 2024-08-05 RX ORDER — PROPOFOL 10 MG/ML
INJECTION, EMULSION INTRAVENOUS CONTINUOUS PRN
Status: DISCONTINUED | OUTPATIENT
Start: 2024-08-05 | End: 2024-08-05

## 2024-08-05 RX ORDER — CEFAZOLIN SODIUM 2 G/50ML
SOLUTION INTRAVENOUS AS NEEDED
Status: DISCONTINUED | OUTPATIENT
Start: 2024-08-05 | End: 2024-08-05

## 2024-08-05 RX ORDER — FENTANYL CITRATE 50 UG/ML
INJECTION, SOLUTION INTRAMUSCULAR; INTRAVENOUS AS NEEDED
Status: DISCONTINUED | OUTPATIENT
Start: 2024-08-05 | End: 2024-08-05

## 2024-08-05 RX ORDER — MIDAZOLAM HYDROCHLORIDE 2 MG/2ML
INJECTION, SOLUTION INTRAMUSCULAR; INTRAVENOUS AS NEEDED
Status: DISCONTINUED | OUTPATIENT
Start: 2024-08-05 | End: 2024-08-05

## 2024-08-05 RX ORDER — SODIUM CHLORIDE, SODIUM LACTATE, POTASSIUM CHLORIDE, CALCIUM CHLORIDE 600; 310; 30; 20 MG/100ML; MG/100ML; MG/100ML; MG/100ML
INJECTION, SOLUTION INTRAVENOUS CONTINUOUS PRN
Status: DISCONTINUED | OUTPATIENT
Start: 2024-08-05 | End: 2024-08-05

## 2024-08-05 RX ORDER — KETOROLAC TROMETHAMINE 30 MG/ML
INJECTION, SOLUTION INTRAMUSCULAR; INTRAVENOUS AS NEEDED
Status: DISCONTINUED | OUTPATIENT
Start: 2024-08-05 | End: 2024-08-05

## 2024-08-05 RX ADMIN — PROPOFOL 100 MCG/KG/MIN: 10 INJECTION, EMULSION INTRAVENOUS at 14:01

## 2024-08-05 RX ADMIN — MIDAZOLAM 2 MG: 1 INJECTION INTRAMUSCULAR; INTRAVENOUS at 13:52

## 2024-08-05 RX ADMIN — CEFAZOLIN SODIUM 2000 MG: 2 SOLUTION INTRAVENOUS at 13:58

## 2024-08-05 RX ADMIN — KETOROLAC TROMETHAMINE 30 MG: 30 INJECTION, SOLUTION INTRAMUSCULAR; INTRAVENOUS at 15:03

## 2024-08-05 RX ADMIN — LIDOCAINE HYDROCHLORIDE 50 MG: 10 INJECTION, SOLUTION EPIDURAL; INFILTRATION; INTRACAUDAL; PERINEURAL at 13:59

## 2024-08-05 RX ADMIN — FENTANYL CITRATE 100 MCG: 50 INJECTION, SOLUTION INTRAMUSCULAR; INTRAVENOUS at 14:01

## 2024-08-05 RX ADMIN — SODIUM CHLORIDE, SODIUM LACTATE, POTASSIUM CHLORIDE, AND CALCIUM CHLORIDE: .6; .31; .03; .02 INJECTION, SOLUTION INTRAVENOUS at 13:40

## 2024-08-05 NOTE — H&P
No changes to history and physical from 7/31/2024              .  Preoperative clearance  Patient seen examined chart reviewed  EKG completed was normal  Will check CBC, BMP, PT, PTT, and UA today  2.  Left foot pain  3.  Hallux valgus/bunion left foot  For surgical correction 8/5/2024 at [facility] with podiatrist, Dr. Jain  4.  NSAID therapy I recommend patient not use his meloxicam or diclofenac till after surgery patient may use Tylenol  5.  Return as needed           Assessment & Plan  1. Pre-op examination  -     POCT ECG  -     Basic metabolic panel; Future; Expected date: 07/31/2024  -     CBC; Future; Expected date: 07/31/2024  -     UA (URINE) with reflex to Scope; Future; Expected date: 07/31/2024  -     Protime-INR; Future; Expected date: 07/31/2024  -     APTT; Future; Expected date: 07/31/2024  2. Left foot pain  -     Basic metabolic panel; Future; Expected date: 07/31/2024  -     CBC; Future; Expected date: 07/31/2024  -     UA (URINE) with reflex to Scope; Future; Expected date: 07/31/2024  -     Protime-INR; Future; Expected date: 07/31/2024  -     APTT; Future; Expected date: 07/31/2024  3. Bunion of left foot  Assessment & Plan:  For surgery 8/5/2024 with Dr. aJin  4. Encounter for monitoring NSAID therapy  -     Basic metabolic panel; Future; Expected date: 07/31/2024  -     CBC; Future; Expected date: 07/31/2024  -     UA (URINE) with reflex to Scope; Future; Expected date: 07/31/2024  -     Protime-INR; Future; Expected date: 07/31/2024  -     APTT; Future; Expected date: 07/31/2024        Depression Screening and Follow-up Plan: Patient was screened for depression during today's encounter. They screened negative with a PHQ-2 score of 0.              History of Present Illness     Patient is having left foot pain secondary to bunion.  Patient scheduled for surgery at [facility] with podiatry, Dr. Jain.  Patient is Citizen of [place]-speaking  interpretive service was used without difficulty           Review of Systems   Constitutional: Negative.    HENT: Negative.     Eyes: Negative.    Respiratory: Negative.     Cardiovascular: Negative.    Gastrointestinal: Negative.    Endocrine: Negative.    Genitourinary: Negative.    Musculoskeletal:         HPI   Skin: Negative.    Allergic/Immunologic: Negative.    Neurological: Negative.    Hematological: Negative.    Psychiatric/Behavioral: Negative.

## 2024-08-05 NOTE — NURSING NOTE
No distress. Toes warm to touch. Positive sensation and movement of toes. Dressing clean, dry and intact.

## 2024-08-05 NOTE — OP NOTE
OPERATIVE REPORT - Podiatry  PATIENT NAME: Wing Stein    :  1995  MRN: [id number]  Pt Location:  OR ROOM 10    SURGERY DATE: 2024    Surgeons and Role:     * Gaston Jain DPM - Primary     * Steve Hernandez DPM - Assisting    Pre-op Diagnosis:  Hallux valgus (acquired), left foot [M20.12]  Pain in left foot [M79.672]    Post-Op Diagnosis Codes:     * Hallux valgus (acquired), left foot [M20.12]     * Pain in left foot [M79.672]    Procedure(s) (LRB):  BUNIONECTOMY (Left)    Specimen(s):  * No specimens in log *    Estimated Blood Loss:   Minimal    Drains:  * No LDAs found *    Anesthesia Type:   IV Sedation with Anesthesia with 10 ml of 2% Lidocaine and 10 ml 0.5% Bupivacaine    Hemostasis:  - Atraumatic technique  - Manual compression   - Pneumatic ankle tourniquet     Materials:  Implant Name Type Inv. Item Serial No.  Lot No. LRB No. Used Action   SCREW  DAVID 3 X 16MM ASNIS MICRO - [id number]  SCREW  DAVID 3 X 16MM ASNIS MICRO  [employer]  Left 1 Implanted   SCREW  DAVID 3 X 17MM ASNIS MICRO - [id number]  SCREW  DAVID 3 X 17MM ASNIS MICRO  [employer]  Left 1 Implanted     Operative Findings:  - Consistent with diagnosis     Complications:   None    Procedure and Technique:     Under mild sedation, the patient was brought into the operating room and placed on the operating room table in the supine position. IV sedation was achieved by anesthesia team and a universal timeout was performed where all parties are in agreement of correct patient, correct procedure and correct site. A pneumatic tourniquet was then placed over the patient's left lower extremity with ample padding. A Nunez block was performed consisting of 10 ml of 2% Lidocaine. The foot was then prepped and draped in the usual aseptic manner. An esmarch bandage was used to exsangunate the foot and the pneumatic tourniquet was then inflated to 250 mmHg.    Attention was then directed to the dorsal aspect  of the first metatarsophalangeal joint medial to the EHL tendon where an approximately 6 cm linear incision was made through skin and subcutaneous tissue. The incision was deepened through the subcutaneous tissues using sharp and blunt dissection. Care was taken to identify and retract all vital neural and vascular structures. All bleeders were cauterized and ligated as necessary. An L-shaped capsulotomy was performed over the dorsal aspect of the MPJ. The periosteal and capsular structures were then carefully dissected free of their osseous attachments and reflected medially and laterally, thus exposing the head of the first metatarsal at the operative site. Attention was then directed to the 1st interspace via the original skin incision where the dissection was continued deep using sharp dissection down to the conjoined tendon of the adductor halluces was then identified and transected at its attachment. At this time the lateral contraction presents on the hallux was noted to be reduced and the sesamoid apparatus was noted to float into a more corrected medial position. Attention was then directed to the first met head where the medial prominence was resected by the sagittal bone saw. A through and through V type osteotomy was made at an approximately 60 degree angle. This cut was created in the metataphyseal region of the bone utilizing a sagittal bone saw and the apices of this osteotomy pointing proximal plantarly and proximal dorsally. Upon completion of this osteotomy, the capital fragment was distracted and shifted laterally into a more corrected position and impacted onto the shaft of the first met. 2 K wires were used as temporary fixation across the osteotomy site. With proper AO technique two screws as listed above serves as fixation across the osteotomy site. Attention was directed to the remaining medial bone shelf proximal to the osteotomy site which was resected using a sagittal saw and passed from  "operative field. Correction of the deformity was assessed at this time and noted to be adequate. The wound was then irrigated with copious amounts of sterile saline. The periosteal and capsular structures were reapproximated using 3-0 Vicryl. The subQ tissues were reapproximated using 4-0 Vicryl and the skin was reapproximated using 4-0 Prolene in a horizontal mattress suture technique. Additional 10 ml of 0.5% Marcaine was then injected locally adjacent to the surgical incision. The incision was then dressed with Betadine adaptic, Dry sterile dressing.     The tourniquet was deflated at approximately 57 min and normal hyperemic response was noted to all digits. The patient tolerated the procedure and anesthesia well without immediate complications and transferred to PACU with vital signs stable.     Dr. Jain was present during the entire procedure and participated in all key aspects.    SIGNATURE: Steve Hernandez DPM  DATE: August 5, 2024  TIME: 3:12 PM      Portions of the record may have been created with voice recognition software. Occasional wrong word or \"sound a like\" substitutions may have occurred due to the inherent limitations of voice recognition software. Read the chart carefully and recognize, using context, where substitutions have occurred.          "

## 2024-08-05 NOTE — ANESTHESIA POSTPROCEDURE EVALUATION
Post-Op Assessment Note    CV Status:  Stable  Pain Score: 0    Pain management: adequate       Mental Status:  Awake and sleepy   Hydration Status:  Euvolemic   PONV Controlled:  Controlled   Airway Patency:  Patent     Post Op Vitals Reviewed: Yes    No anethesia notable event occurred.    Staff: SAMEER               /65 (08/05/24 1515)    Temp (!) 96.8 °F (36 °C) (08/05/24 1515)    Pulse 57 (08/05/24 1515)   Resp 20 (08/05/24 1515)    SpO2 100 % (08/05/24 1515)

## 2024-08-05 NOTE — DISCHARGE SUMMARY
Discharge Summary Outpatient Procedure Podiatry -   Wing Lindsey Stein 29 y.o. male MRN: [id number]  Unit/Bed#: OR POOL Encounter: [id number]    Admission Date: 8/5/2024     Admitting Diagnosis: Hallux valgus (acquired), left foot [M20.12]  Pain in left foot [M79.672]    Discharge Diagnosis: same    Procedures Performed: BUNIONECTOMY: 01897 (CPT®)    Complications: none    Condition at Discharge: stable    Discharge instructions/Information to patient and family:   See after visit summary for information provided to patient and family.      Provisions for Follow-Up Care/Important appointments:  See after visit summary for information related to follow-up care and any pertinent home health orders.      Discharge Medications:  See after visit summary for reconciled discharge medications provided to patient and family.

## 2024-08-05 NOTE — DISCHARGE INSTR - AVS FIRST PAGE
Dr. Gaston Jain, DPM,   Post-Operative Instructions    1. Take your prescribed medication as directed. You can take ibuprofen in between doses of the narcotic if breakthrough pain occurs  2. Upon arrival at home, lie down and elevate your surgical foot on 2 pillows. Unless you're moving from the couch, bed, or bathroom, make sure to elevate the foot. Swelling is not your friend.   3. Remain quiet, off your feet as much as possible, for the first 24-48 hours. This is when your feet first swell and may become painful. After 48 hours you may begin limited walking following these restrictions:   Partial weightbearing to heel of surgical foot in surgical shoe with crutches  4. Drink large quantities of water. Consume no alcohol. Continue a well-balanced diet.  5. Report any unusual discomfort or fever to this office.  6. A limited amount of discomfort and swelling is to be expected. In some cases the skin may take on a bruised appearance. The surgical solution that was applied to your foot prior to the operation is dark in color and the operation site may appear to be oozing when it actually is not.  7. A slight amount of blood is to be expected, and is no cause for alarm. Do not remove the dressings. If there is active bleeding and if the bleeding persists, add additional gauze to the bandage, apply direct pressure, elevate your feet and call this office.  8. Do not get the dressings wet. As regular bathing may be inconvenient, sponge baths are recommended. If you shower, keep the dressing dry.   9. When anesthesia wears off and if any discomfort should be present, apply an ice pack directly over the operated area for 15 minute intervals for several hours or until the pain leaves. (USE IN EXCESS OF 15 MINUTES COULD CAUSE FROSTBITE). Do not use hot water bags or electric pads. A convenient icepack can be made by placing ice cubes in a plastic bag and covering this with a towel.  10. If necessary, take a mild laxative  before retiring.  11. Wear your special boot anytime you put weight on your foot, even if it is just to walk to the bathroom and back. It will probably be a few weeks before you will be permitted to try regular shoes.  12. Having performed the operation, we are interested in a prompt recovery. Please cooperate by following the above instructions.  13. Please call to confirm your post-op appointment or call with any other questions.

## 2024-08-05 NOTE — ANESTHESIA PREPROCEDURE EVALUATION
Procedure:  BUNIONECTOMY (Left: Foot)    Relevant Problems   NEURO/PSYCH   (+) Chronic tension-type headache, not intractable        Physical Exam    Airway    Mallampati score: II  TM Distance: >3 FB  Neck ROM: full     Dental   No notable dental hx     Cardiovascular  Rhythm: regular, Rate: normal, Cardiovascular exam normal    Pulmonary  Pulmonary exam normal Breath sounds clear to auscultation    Other Findings        Anesthesia Plan  ASA Score- 1     Anesthesia Type- IV sedation with anesthesia with ASA Monitors.         Additional Monitors:     Airway Plan:            Plan Factors-Exercise tolerance (METS): >4 METS.    Chart reviewed. EKG reviewed. Imaging results reviewed. Existing labs reviewed. Patient summary reviewed.    Patient is not a current smoker.  Patient did not smoke on day of surgery.            Induction- intravenous.    Postoperative Plan- Plan for postoperative opioid use.     Perioperative Resuscitation Plan - Level 1 - Full Code.       Informed Consent- Anesthetic plan and risks discussed with patient.  I personally reviewed this patient with the CRNA. Discussed and agreed on the Anesthesia Plan with the CRNA..

## 2024-08-21 ENCOUNTER — TELEPHONE (OUTPATIENT)
Dept: FAMILY MEDICINE CLINIC | Facility: CLINIC | Age: 29
End: 2024-08-21

## 2024-08-21 NOTE — TELEPHONE ENCOUNTER
Patient sent a message on 8/20/24 in regards of obtaining AVS for Aug 14th, 2024. Patient didn't have an appt or was seen on that day.     Once calling pt I transferred call to ELENI Pisano to speak with pt as he wished to speak with someone who spoke Bengali, once after discussing. Patient stated the message was for Podiatry and not for our office and that pt has already obtained AVS from Podiatrist.     Please disregard message.     Thank you.   Chasity

## (undated) DEVICE — Device: Brand: OMNICLOSE TROCAR SITE CLOSURE DEVICE

## (undated) DEVICE — INTENDED FOR TISSUE SEPARATION, AND OTHER PROCEDURES THAT REQUIRE A SHARP SURGICAL BLADE TO PUNCTURE OR CUT.: Brand: BARD-PARKER SAFETY BLADES SIZE 15, STERILE

## (undated) DEVICE — LIGHT GLOVE GREEN

## (undated) DEVICE — ANTIBACTERIAL UNDYED BRAIDED (POLYGLACTIN 910), SYNTHETIC ABSORBABLE SUTURE: Brand: COATED VICRYL

## (undated) DEVICE — TROCAR: Brand: KII SLEEVE

## (undated) DEVICE — HARMONIC 1100 SHEARS, 36CM SHAFT LENGTH: Brand: HARMONIC

## (undated) DEVICE — STERILE POLYISOPRENE POWDER-FREE SURGICAL GLOVES: Brand: PROTEXIS

## (undated) DEVICE — NEEDLE BLUNT 18 G X 1 1/2IN

## (undated) DEVICE — SCD SEQUENTIAL COMPRESSION COMFORT SLEEVE MEDIUM KNEE LENGTH: Brand: KENDALL SCD

## (undated) DEVICE — ADHESIVE SKIN HIGH VISCOSITY EXOFIN 1ML

## (undated) DEVICE — CHLORAPREP HI-LITE 26ML ORANGE

## (undated) DEVICE — BASIC PACK: Brand: CONVERTORS

## (undated) DEVICE — STANDARD SURGICAL GOWN, L: Brand: CONVERTORS

## (undated) DEVICE — SUT MONOCRYL 4-0 PS-2 27 IN Y426H

## (undated) DEVICE — 3M™ STERI-STRIP™ REINFORCED ADHESIVE SKIN CLOSURES, R1547, 1/2 IN X 4 IN (12 MM X 100 MM), 6 STRIPS/ENVELOPE: Brand: 3M™ STERI-STRIP™

## (undated) DEVICE — SINGLE PORT MANIFOLD: Brand: NEPTUNE 2

## (undated) DEVICE — CRADLE EXTREMITY UNIVERSAL CONTOURED

## (undated) DEVICE — CHEST/BREAST DRAPE: Brand: CONVERTORS

## (undated) DEVICE — IRRIG ENDO FLO TUBING

## (undated) DEVICE — INTENDED FOR TISSUE SEPARATION, AND OTHER PROCEDURES THAT REQUIRE A SHARP SURGICAL BLADE TO PUNCTURE OR CUT.: Brand: BARD-PARKER SAFETY BLADES SIZE 11, STERILE

## (undated) DEVICE — SURGICAL CLIPPER BLADE GENERAL USE

## (undated) DEVICE — TROCAR: Brand: KII® SLEEVE

## (undated) DEVICE — ALLENTOWN LAP CHOLE APP PACK: Brand: CARDINAL HEALTH

## (undated) DEVICE — ANTI-FOG SOLUTION WITH FOAM PAD: Brand: DEVON

## (undated) DEVICE — NEEDLE 25G X 1 1/2

## (undated) DEVICE — BASIC SINGLE BASIN-LF: Brand: MEDLINE INDUSTRIES, INC.

## (undated) DEVICE — SPONGE 4 X 4 XRAY 16 PLY STRL LF RFD

## (undated) DEVICE — PENCIL ELECTROSURG E-Z CLEAN -0035H

## (undated) DEVICE — 3M™ STERI-STRIP™ REINFORCED ADHESIVE SKIN CLOSURES, R1542, 1/4 IN X 1-1/2 IN (6 MM X 38 MM), 6 STRIPS/ENVELOPE: Brand: 3M™ STERI-STRIP™

## (undated) DEVICE — ENDOPOUCH RETRIEVER SPECIMEN RETRIEVAL BAGS: Brand: ENDOPOUCH RETRIEVER

## (undated) DEVICE — ENDOPATH PNEUMONEEDLE INSUFFLATION NEEDLES WITH LUER LOCK CONNECTORS 120MM: Brand: ENDOPATH

## (undated) DEVICE — SYRINGE 10ML LL CONTROL TOP

## (undated) DEVICE — SWABSTCK, BENZOIN TINCTURE, 1/PK, STRL: Brand: APLICARE

## (undated) DEVICE — DRAPE TOWEL: Brand: CONVERTORS

## (undated) DEVICE — 1820 FOAM BLOCK NEEDLE COUNTER: Brand: DEVON

## (undated) DEVICE — DISPOSABLE OR TOWEL: Brand: CARDINAL HEALTH

## (undated) DEVICE — SYRINGE 30ML LL

## (undated) DEVICE — CHLORAPREP HI-LITE 10.5ML ORANGE

## (undated) DEVICE — MINOR PROCEDURE DRAPE: Brand: CONVERTORS

## (undated) DEVICE — TUBING SET W. FILTER, GAS FLOW 30 L/MIN: Brand: N.A.

## (undated) DEVICE — SUT ETHIBOND 0 CT-2 30 IN X412H

## (undated) DEVICE — SKIN MARKER DUAL TIP WITH RULER CAP, FLEXIBLE RULER AND LABELS: Brand: DEVON

## (undated) DEVICE — THE ECHELON FLEX POWERED PLUS ARTICULATING ENDOSCOPIC LINEAR CUTTERS ARE STERILE, SINGLE PATIENT USE INSTRUMENTS THAT SIMULTANEOUSLYCUT AND STAPLE TISSUE. THERE ARE SIX STAGGERED ROWS OF STAPLES, THREE ON EITHER SIDE OF THE CUT LINE. THE ECHELON FLEX 45 POWERED PLUSINSTRUMENTS HAVE A STAPLE LINE THAT IS APPROXIMATELY 45 MM LONG AND A CUT LINE THAT IS APPROXIMATELY 42 MM LONG. THE SHAFT CAN ROTATE FREELYIN BOTH DIRECTIONS AND AN ARTICULATION MECHANISM ENABLES THE DISTAL PORTION OF THE SHAFT TO PIVOT TO FACILITATE LATERAL ACCESS TO THE OPERATIVESITE.THE INSTRUMENTS ARE PACKAGED WITH A PRIMARY LITHIUM BATTERY PACK THAT MUST BE INSTALLED PRIOR TO USE. THERE ARE SPECIFIC REQUIREMENTS FORDISPOSING OF THE BATTERY PACK. REFER TO THE BATTERY PACK DISPOSAL SECTION.THE INSTRUMENTS ARE PACKAGED WITHOUT A RELOAD AND MUST BE LOADED PRIOR TO USE. A STAPLE RETAINING CAP ON THE RELOAD PROTECTS THE STAPLE LEGPOINTS DURING SHIPPING AND TRANSPORTATION. THE INSTRUMENTS’ LOCK-OUT FEATURE IS DESIGNED TO PREVENT A USED OR IMPROPERLY INSTALLED RELOADFROM BEING REFIRED OR AN INSTRUMENT FROM BEING FIRED WITHOUT A RELOAD.: Brand: ECHELON FLEX

## (undated) DEVICE — SUT VICRYL 0 UR-6 27 IN J603H

## (undated) DEVICE — STERILE POLYISOPRENE POWDER-FREE SURGICAL GLOVES WITH EMOLLIENT COATING: Brand: PROTEXIS

## (undated) DEVICE — TROCAR: Brand: KII FIOS FIRST ENTRY

## (undated) DEVICE — THE ECHELON, ECHELON ENDOPATH™ AND ECHELON FLEX™ FAMILIES OF ENDOSCOPIC LINEAR CUTTERS AND RELOADS ARE STERILE, SINGLE PATIENT USE INSTRUMENTS THAT SIMULTANEOUSLY CUT AND STAPLE TISSUE. THERE ARE SIX STAGGERED ROWS OF STAPLES, THREE ON EITHER SIDE OF THE CUT LINE. THE 45 MM INSTRUMENTS HAVE A STAPLE LINE THATIS APPROXIMATELY 45 MM LONG AND A CUT LINE THAT IS APPROXIMATELY 42 MM LONG. THE SHAFT CAN ROTATE FREELY IN BOTH DIRECTIONS AND AN ARTICULATION MECHANISM ON ARTICULATING INSTRUMENTS ENABLES BENDING THE DISTAL PORTIONOF THE SHAFT TO FACILITATE LATERAL ACCESS OF THE OPERATIVE SITE.THE INSTRUMENTS ARE SHIPPED WITHOUT A RELOAD AND MUST BE LOADED PRIOR TO USE. A STAPLE RETAINING CAP ON THE RELOAD PROTECTS THE STAPLE LEG POINTS DURING SHIPPING AND TRANSPORTATION. THE INSTRUMENTS’ LOCK-OUT FEATURE IS DESIGNED TO PREVENT A USED RELOAD FROM BEING REFIRED.: Brand: ECHELON ENDOPATH